# Patient Record
Sex: MALE | Race: WHITE | Employment: FULL TIME | URBAN - METROPOLITAN AREA
[De-identification: names, ages, dates, MRNs, and addresses within clinical notes are randomized per-mention and may not be internally consistent; named-entity substitution may affect disease eponyms.]

---

## 2020-06-09 ENCOUNTER — HOSPITAL ENCOUNTER (EMERGENCY)
Age: 30
Discharge: HOME | End: 2020-06-09
Payer: OTHER GOVERNMENT

## 2020-06-09 ENCOUNTER — APPOINTMENT (OUTPATIENT)
Dept: GENERAL RADIOLOGY | Age: 30
End: 2020-06-09
Payer: OTHER GOVERNMENT

## 2020-06-09 VITALS
SYSTOLIC BLOOD PRESSURE: 111 MMHG | TEMPERATURE: 98.1 F | HEIGHT: 66 IN | HEART RATE: 64 BPM | OXYGEN SATURATION: 98 % | DIASTOLIC BLOOD PRESSURE: 78 MMHG | RESPIRATION RATE: 16 BRPM | WEIGHT: 145 LBS | BODY MASS INDEX: 23.3 KG/M2

## 2020-06-09 PROCEDURE — 72100 X-RAY EXAM L-S SPINE 2/3 VWS: CPT

## 2020-06-09 PROCEDURE — 96372 THER/PROPH/DIAG INJ SC/IM: CPT

## 2020-06-09 PROCEDURE — 6360000002 HC RX W HCPCS: Performed by: PHYSICIAN ASSISTANT

## 2020-06-09 PROCEDURE — 99283 EMERGENCY DEPT VISIT LOW MDM: CPT

## 2020-06-09 PROCEDURE — 6370000000 HC RX 637 (ALT 250 FOR IP): Performed by: PHYSICIAN ASSISTANT

## 2020-06-09 RX ORDER — NAPROXEN 500 MG/1
500 TABLET ORAL 2 TIMES DAILY
Qty: 20 TABLET | Refills: 0 | Status: SHIPPED | OUTPATIENT
Start: 2020-06-09 | End: 2020-10-06

## 2020-06-09 RX ORDER — METHYLPREDNISOLONE 4 MG/1
TABLET ORAL
Qty: 1 KIT | Refills: 0 | Status: SHIPPED | OUTPATIENT
Start: 2020-06-09 | End: 2020-06-15

## 2020-06-09 RX ORDER — LIDOCAINE 4 G/G
1 PATCH TOPICAL ONCE
Status: DISCONTINUED | OUTPATIENT
Start: 2020-06-09 | End: 2020-06-09 | Stop reason: HOSPADM

## 2020-06-09 RX ORDER — METHYLPREDNISOLONE SODIUM SUCCINATE 125 MG/2ML
125 INJECTION, POWDER, LYOPHILIZED, FOR SOLUTION INTRAMUSCULAR; INTRAVENOUS ONCE
Status: COMPLETED | OUTPATIENT
Start: 2020-06-09 | End: 2020-06-09

## 2020-06-09 RX ORDER — KETOROLAC TROMETHAMINE 30 MG/ML
30 INJECTION, SOLUTION INTRAMUSCULAR; INTRAVENOUS ONCE
Status: COMPLETED | OUTPATIENT
Start: 2020-06-09 | End: 2020-06-09

## 2020-06-09 RX ORDER — LIDOCAINE 50 MG/G
1 PATCH TOPICAL DAILY
Qty: 30 PATCH | Refills: 0 | Status: SHIPPED | OUTPATIENT
Start: 2020-06-09 | End: 2020-07-09

## 2020-06-09 RX ADMIN — METHYLPREDNISOLONE SODIUM SUCCINATE 125 MG: 125 INJECTION, POWDER, FOR SOLUTION INTRAMUSCULAR; INTRAVENOUS at 10:47

## 2020-06-09 RX ADMIN — KETOROLAC TROMETHAMINE 30 MG: 30 INJECTION, SOLUTION INTRAMUSCULAR at 10:47

## 2020-06-09 ASSESSMENT — ENCOUNTER SYMPTOMS
EYE DISCHARGE: 0
SORE THROAT: 0
NAUSEA: 0
VOMITING: 0
EYE REDNESS: 0
COUGH: 0
BACK PAIN: 1
DIARRHEA: 0
SINUS PAIN: 0
ABDOMINAL PAIN: 0
CONSTIPATION: 0
RHINORRHEA: 0
SHORTNESS OF BREATH: 0
SINUS PRESSURE: 0
CHEST TIGHTNESS: 0

## 2020-06-09 ASSESSMENT — PAIN SCALES - GENERAL
PAINLEVEL_OUTOF10: 8
PAINLEVEL_OUTOF10: 8

## 2020-06-09 ASSESSMENT — PAIN DESCRIPTION - LOCATION: LOCATION: BACK

## 2020-06-09 NOTE — ED PROVIDER NOTES
[address] ED  EMERGENCY DEPARTMENT ENCOUNTER        Pt Name: Vivian Aguilar  MRN: [id number]  [place] 1990  Date of evaluation: 6/9/2020  Provider: Brooke Abarca PA-C  PCP: THOR Garg CNP  ED Attending: No att. providers found      This patient was not seen and evaluated by the attending physician No att. providers found. I have independently evaluated this patient. 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 Vivian Aguilar is a 30 y.o. male for evaluation of lower back pain after stepping over a lazyboy chair, felt a popping crunching sound. Sudden onset of pain. Pressure like pain. 8/10 pain is constant no matter what position he is in. H/o chronic back pain. No weakness no fever, denies any incontinece or painful urination. Nursing Notes were all reviewed and agreed with or any disagreements were addressed  in the HPI. REVIEW OF SYSTEMS  (2-9 systems for level 4, 10 or more for level 5)     Review of Systems   Constitutional: Negative for chills and fever. HENT: Negative. Negative for congestion, rhinorrhea, sinus pressure, sinus pain and sore throat. Eyes: Negative for discharge, redness and visual disturbance. Respiratory: Negative for cough, chest tightness and shortness of breath. Cardiovascular: Negative for chest pain and palpitations. Gastrointestinal: Negative for abdominal pain, constipation, diarrhea, nausea and vomiting. Genitourinary: Negative for difficulty urinating, dysuria and frequency. Musculoskeletal: Positive for back pain. Skin: Negative. Neurological: Negative. Negative for dizziness, weakness, numbness and headaches. EKG.      RADIOLOGY:         Interpretation per the Radiologist below, if available at the time of this note:    XR LUMBAR SPINE (2-3 VIEWS)   Final Result   L5-S1 grade 1 spondylolisthesis associated with bilateral pars defects. Although there are no comparisons, the degree of anterior slippages likely   chronic. No acute osseous abnormality is otherwise identified. EMERGENCY DEPARTMENT COURSE and DIFFERENTIAL DIAGNOSIS/MDM:   Vitals:    Vitals:    06/09/20 0809   BP: 114/73   Pulse: 71   Resp: 12   Temp: 98.1 °F (36.7 °C)   TempSrc: Oral   SpO2: 99%   Weight: 145 lb (65.8 kg)   Height: 5' 6\" (1.676 m)       Patient was given the following medications:  Medications   ketorolac (TORADOL) injection 30 mg (has no administration in time range)   methylPREDNISolone sodium (SOLU-MEDROL) injection 125 mg (has no administration in time range)   lidocaine 4 % external patch 1 patch (has no administration in time range)         Afebrile, stable, patient presents to the ED for evaluation. Nontoxic patient in no acute distress presents to the ED for evaluation of a crunching sensation and worsening chronic lower back pain. Patient's had past injuries to his back. Patient has not taken anything for his symptoms provided patient with Toradol and Solu-Medrol in addition to a Lidoderm patch this helps his symptoms on reevaluation x-rays are ordered to rule out acute bony injury. X-rays reveal an L5-S1 grade 1 spondylolithiasis associated with a bilateral pars defect of L5 vertebral body heights and disc spacing is preserved patient is able to ambulate has sensation to his lower extremities reflexes are intact. Discussed findings with the patient advised he will need to follow-up with spine. All questions are answered. Indications for return to the ED are discussed. Patient is advised if any new or worsening symptoms arise they should immediately return to the emergency room.   Follow-up with primary care

## 2020-10-06 ENCOUNTER — HOSPITAL ENCOUNTER (EMERGENCY)
Age: 30
Discharge: HOME | End: 2020-10-06
Attending: EMERGENCY MEDICINE
Payer: OTHER GOVERNMENT

## 2020-10-06 VITALS
DIASTOLIC BLOOD PRESSURE: 64 MMHG | SYSTOLIC BLOOD PRESSURE: 117 MMHG | OXYGEN SATURATION: 98 % | BODY MASS INDEX: 23.86 KG/M2 | WEIGHT: 152 LBS | HEIGHT: 67 IN | HEART RATE: 68 BPM | RESPIRATION RATE: 16 BRPM | TEMPERATURE: 97.8 F

## 2020-10-06 PROCEDURE — 6360000002 HC RX W HCPCS: Performed by: EMERGENCY MEDICINE

## 2020-10-06 PROCEDURE — 99282 EMERGENCY DEPT VISIT SF MDM: CPT

## 2020-10-06 PROCEDURE — 96372 THER/PROPH/DIAG INJ SC/IM: CPT

## 2020-10-06 RX ORDER — KETOROLAC TROMETHAMINE 30 MG/ML
30 INJECTION, SOLUTION INTRAMUSCULAR; INTRAVENOUS ONCE
Status: COMPLETED | OUTPATIENT
Start: 2020-10-06 | End: 2020-10-06

## 2020-10-06 RX ADMIN — KETOROLAC TROMETHAMINE 30 MG: 30 INJECTION, SOLUTION INTRAMUSCULAR; INTRAVENOUS at 08:26

## 2020-10-06 ASSESSMENT — PAIN DESCRIPTION - LOCATION: LOCATION: BACK

## 2020-10-06 ASSESSMENT — PAIN DESCRIPTION - DESCRIPTORS: DESCRIPTORS: CONSTANT;SHARP

## 2020-10-06 ASSESSMENT — PAIN DESCRIPTION - PAIN TYPE: TYPE: ACUTE PAIN

## 2020-10-06 ASSESSMENT — PAIN DESCRIPTION - ORIENTATION: ORIENTATION: LOWER

## 2020-10-06 ASSESSMENT — PAIN SCALES - GENERAL: PAINLEVEL_OUTOF10: 8

## 2020-10-06 NOTE — ED TRIAGE NOTES
Chief Complaint   Patient presents with    Back Pain     pt c/o lower back pain onset this am when getting out of bed, states felt a \"crunch\" and started having pain, hx of back problems

## 2020-10-06 NOTE — ED PROVIDER NOTES
[address] ED      CHIEF COMPLAINT  Back Pain (pt c/o lower back pain onset this am when getting out of bed, states felt a \"crunch\" and started having pain, hx of back problems)       HISTORY OF PRESENT ILLNESS  Sanju Sanderson is a 26 y.o. male  who presents to the ED for evaluation of back pain. Patient reports having h/o back problems and when he went to get up this morning, felt a \"crunch\" in his back. Reports having history of back injury about 6 7 years ago and has been evaluated by surgery. Since then, reports having intermittent episodes of these lower back pain. Describes pain as lower back bilateral pain with intermittently shooting pain down the right leg. Reports this is unchanged compared his prior back pains. Denies having taken any medications prior to arrival to the emergency department. Denies having associated numbness of extremities, urinary, or bowel incontinence. Denies any new injuries. Denies having any recent illnesses. Specifically denies having fevers, chills, or neck pain. No other complaints, modifying factors or associated symptoms. I have reviewed the following from the nursing documentation. Past Medical History:   Diagnosis Date    Chronic back pain      Past Surgical History:   Procedure Laterality Date    SHOULDER SURGERY       History reviewed. No pertinent family history.   Social History     Socioeconomic History    Marital status:      Spouse name: Not on file    Number of children: Not on file    Years of education: Not on file    Highest education level: Not on file   Occupational History    Not on file   Social Needs    Financial resource strain: Not on file    Food insecurity     Worry: Not on file     Inability: Not on file    Transportation needs     Medical: Not on file     Non-medical: Not on file   Tobacco Use    Smoking status: Former Smoker    Smokeless tobacco: Never Used   Substance and Sexual Activity    Alcohol use: Yes     Alcohol/week: 2.0 - 3.0 standard drinks     Types: 2 - 3 Cans of beer per week     Comment: weekends    Drug use: No    Sexual activity: Not on file   Lifestyle    Physical activity     Days per week: Not on file     Minutes per session: Not on file    Stress: Not on file   Relationships    Social connections     Talks on phone: Not on file     Gets together: Not on file     Attends [employer]an service: Not on file     Active member of club or organization: Not on file     Attends meetings of clubs or organizations: Not on file     Relationship status: Not on file    Intimate partner violence     Fear of current or ex partner: Not on file     Emotionally abused: Not on file     Physically abused: Not on file     Forced sexual activity: Not on file   Other Topics Concern    Not on file   Social History Narrative    Not on file     No current facility-administered medications for this encounter. No current outpatient medications on file. No Known Allergies    REVIEW OF SYSTEMS  10 systems reviewed, pertinent positives per HPI otherwise noted to be negative. PHYSICAL EXAM  /80   Pulse 65   Temp 97.8 °F (36.6 °C) (Oral)   Resp 16   Ht 5' 6.5\" (1.689 m)   Wt 152 lb (68.9 kg)   SpO2 98%   BMI 24.17 kg/m²    GENERAL APPEARANCE: Awake and alert. Cooperative. No acute distress. HENT: Normocephalic. Atraumatic. CN  2-12 grossly intact. HEART/CHEST: RRR. No murmurs appreciated  LUNGS: Respirations unlabored. Speaking comfortably in full sentences. CTAB. ABDOMEN: Soft, non-distended abdomen. Non tender to palpation. No guarding. No rebound. BACK: No nuchal rigidity. No C/T/L-spine step-offs. Tenderness to lower mid lumbar spine and bilateral paraspinal muscles. EXTREMITIES: No gross deformities. Moving all extremities. All extremities neurovascularly intact. SKIN: Warm and dry. No acute rashes. NEUROLOGICAL: Alert and oriented. CN's 2-12 intact. No gross facial drooping.  Strength 5/5, sensation intact. PSYCHIATRIC: Normal mood and affect. LABS  No results found for this visit on 10/06/20. RADIOLOGY  N/A    ED COURSE/MDM  Patient seen and evaluated. At presentation, patient was awake, alert, answering questions appropriately, afebrile, hemodynamically stable, and satting well on room air. Exam remarkable for midline lower lumbar tenderness to palpation along with bilateral paraspinal muscles. Differential diagnosis includes acute exacerbation of chronic back pain, cauda equina, vertebral fracture, among others. However, in the absence of injuries, though likelihood of a new pathology that would be seen on x-ray or CT lumbar spine. Per chart review, patient had x-rays at his prior visits showing spondylolisthesis and pars defects. As patient has no focal neurologic deficits although bilateral lower extremity flexion limited by pain, patient does not warrant imaging at this time. Patient given intramuscular Toradol dose in the emergency department. Patient was instructed to follow-up with his surgeons who he had prior evaluations from, or with PCP within 2 days for further evaluation. He was agreeable with plan. He was provided with a work excuse for 2 days and given work limitations to be cleared by his PCP. Patient discharged home with strict return precautions. During the patient's ED course, the patient was given:  Medications   ketorolac (TORADOL) injection 30 mg (30 mg Intramuscular Given 10/6/20 0826)        CLINICAL IMPRESSION  1. Acute exacerbation of chronic low back pain    2. Strain of lumbar region, initial encounter        Blood pressure 127/80, pulse 65, temperature 97.8 °F (36.6 °C), temperature source Oral, resp. rate 16, height 5' 6.5\" (1.689 m), weight 152 lb (68.9 kg), SpO2 98 %. [place] was discharged home in stable condition. Patient was given scripts for the following medications.  I counseled patient how to take these

## 2020-12-16 ENCOUNTER — HOSPITAL ENCOUNTER (INPATIENT)
Age: 30
LOS: 1 days | Discharge: HOME | DRG: 881 | End: 2020-12-16
Attending: FAMILY MEDICINE | Admitting: PSYCHIATRY & NEUROLOGY
Payer: OTHER GOVERNMENT

## 2020-12-16 VITALS
RESPIRATION RATE: 16 BRPM | OXYGEN SATURATION: 99 % | HEIGHT: 68 IN | HEART RATE: 101 BPM | TEMPERATURE: 99.1 F | WEIGHT: 150 LBS | DIASTOLIC BLOOD PRESSURE: 79 MMHG | BODY MASS INDEX: 22.73 KG/M2 | SYSTOLIC BLOOD PRESSURE: 133 MMHG

## 2020-12-16 PROBLEM — F32.9 MDD (MAJOR DEPRESSIVE DISORDER), SINGLE EPISODE: Status: ACTIVE | Noted: 2020-12-16

## 2020-12-16 PROBLEM — G89.29 CHRONIC LOW BACK PAIN: Status: ACTIVE | Noted: 2020-12-16

## 2020-12-16 PROBLEM — Z63.4 BEREAVEMENT: Status: ACTIVE | Noted: 2020-12-16

## 2020-12-16 PROBLEM — M54.50 CHRONIC LOW BACK PAIN: Status: ACTIVE | Noted: 2020-12-16

## 2020-12-16 PROBLEM — F43.10 PTSD (POST-TRAUMATIC STRESS DISORDER): Status: ACTIVE | Noted: 2020-12-16

## 2020-12-16 LAB
A/G RATIO: 1.5 (ref 1.1–2.2)
ACETAMINOPHEN LEVEL: <5 UG/ML (ref 10–30)
ALBUMIN SERPL-MCNC: 4.8 G/DL (ref 3.4–5)
ALP BLD-CCNC: 74 U/L (ref 40–129)
ALT SERPL-CCNC: 37 U/L (ref 10–40)
ANION GAP SERPL CALCULATED.3IONS-SCNC: 14 MMOL/L (ref 3–16)
AST SERPL-CCNC: 30 U/L (ref 15–37)
BASOPHILS ABSOLUTE: 0.3 K/UL (ref 0–0.2)
BASOPHILS RELATIVE PERCENT: 3.4 %
BILIRUB SERPL-MCNC: 0.4 MG/DL (ref 0–1)
BUN BLDV-MCNC: 9 MG/DL (ref 7–20)
CALCIUM SERPL-MCNC: 9.2 MG/DL (ref 8.3–10.6)
CHLORIDE BLD-SCNC: 104 MMOL/L (ref 99–110)
CO2: 26 MMOL/L (ref 21–32)
CREAT SERPL-MCNC: 0.9 MG/DL (ref 0.9–1.3)
EOSINOPHILS ABSOLUTE: 0.1 K/UL (ref 0–0.6)
EOSINOPHILS RELATIVE PERCENT: 1.5 %
ETHANOL: 127 MG/DL (ref 0–0.08)
ETHANOL: 71 MG/DL (ref 0–0.08)
GFR AFRICAN AMERICAN: >60
GFR NON-AFRICAN AMERICAN: >60
GLOBULIN: 3.3 G/DL
GLUCOSE BLD-MCNC: 99 MG/DL (ref 70–99)
HCT VFR BLD CALC: 45 % (ref 40.5–52.5)
HEMOGLOBIN: 15 G/DL (ref 13.5–17.5)
LYMPHOCYTES ABSOLUTE: 1.7 K/UL (ref 1–5.1)
LYMPHOCYTES RELATIVE PERCENT: 20.9 %
MCH RBC QN AUTO: 27.4 PG (ref 26–34)
MCHC RBC AUTO-ENTMCNC: 33.3 G/DL (ref 31–36)
MCV RBC AUTO: 82.2 FL (ref 80–100)
MONOCYTES ABSOLUTE: 0.4 K/UL (ref 0–1.3)
MONOCYTES RELATIVE PERCENT: 5.1 %
NEUTROPHILS ABSOLUTE: 5.5 K/UL (ref 1.7–7.7)
NEUTROPHILS RELATIVE PERCENT: 69.1 %
PDW BLD-RTO: 13.6 % (ref 12.4–15.4)
PLATELET # BLD: 248 K/UL (ref 135–450)
PMV BLD AUTO: 7.8 FL (ref 5–10.5)
POTASSIUM SERPL-SCNC: 3.9 MMOL/L (ref 3.5–5.1)
RBC # BLD: 5.47 M/UL (ref 4.2–5.9)
SALICYLATE, SERUM: <0.3 MG/DL (ref 15–30)
SARS-COV-2, NAAT: NOT DETECTED
SODIUM BLD-SCNC: 144 MMOL/L (ref 136–145)
T4 FREE: 1.7 NG/DL (ref 0.9–1.8)
TOTAL PROTEIN: 8.1 G/DL (ref 6.4–8.2)
TSH SERPL DL<=0.05 MIU/L-ACNC: 5.5 UIU/ML (ref 0.27–4.2)
WBC # BLD: 7.9 K/UL (ref 4–11)

## 2020-12-16 PROCEDURE — 36415 COLL VENOUS BLD VENIPUNCTURE: CPT

## 2020-12-16 PROCEDURE — G0480 DRUG TEST DEF 1-7 CLASSES: HCPCS

## 2020-12-16 PROCEDURE — 80053 COMPREHEN METABOLIC PANEL: CPT

## 2020-12-16 PROCEDURE — 5130000000 HC BRIDGE APPOINTMENT

## 2020-12-16 PROCEDURE — 84443 ASSAY THYROID STIM HORMONE: CPT

## 2020-12-16 PROCEDURE — 85025 COMPLETE CBC W/AUTO DIFF WBC: CPT

## 2020-12-16 PROCEDURE — U0002 COVID-19 LAB TEST NON-CDC: HCPCS

## 2020-12-16 PROCEDURE — 99221 1ST HOSP IP/OBS SF/LOW 40: CPT | Performed by: NURSE PRACTITIONER

## 2020-12-16 PROCEDURE — 1240000000 HC EMOTIONAL WELLNESS R&B

## 2020-12-16 PROCEDURE — 99223 1ST HOSP IP/OBS HIGH 75: CPT | Performed by: PSYCHIATRY & NEUROLOGY

## 2020-12-16 PROCEDURE — 99284 EMERGENCY DEPT VISIT MOD MDM: CPT

## 2020-12-16 PROCEDURE — 6370000000 HC RX 637 (ALT 250 FOR IP): Performed by: PSYCHIATRY & NEUROLOGY

## 2020-12-16 PROCEDURE — 84439 ASSAY OF FREE THYROXINE: CPT

## 2020-12-16 RX ORDER — TRAZODONE HYDROCHLORIDE 50 MG/1
50 TABLET ORAL NIGHTLY PRN
Status: DISCONTINUED | OUTPATIENT
Start: 2020-12-16 | End: 2020-12-16 | Stop reason: HOSPADM

## 2020-12-16 RX ORDER — MAGNESIUM HYDROXIDE/ALUMINUM HYDROXICE/SIMETHICONE 120; 1200; 1200 MG/30ML; MG/30ML; MG/30ML
30 SUSPENSION ORAL EVERY 6 HOURS PRN
Status: DISCONTINUED | OUTPATIENT
Start: 2020-12-16 | End: 2020-12-16 | Stop reason: HOSPADM

## 2020-12-16 RX ORDER — DULOXETIN HYDROCHLORIDE 30 MG/1
30 CAPSULE, DELAYED RELEASE ORAL DAILY
Status: DISCONTINUED | OUTPATIENT
Start: 2020-12-16 | End: 2020-12-16 | Stop reason: HOSPADM

## 2020-12-16 RX ORDER — ACETAMINOPHEN 325 MG/1
650 TABLET ORAL EVERY 4 HOURS PRN
Status: DISCONTINUED | OUTPATIENT
Start: 2020-12-16 | End: 2020-12-16 | Stop reason: HOSPADM

## 2020-12-16 RX ORDER — DULOXETIN HYDROCHLORIDE 30 MG/1
30 CAPSULE, DELAYED RELEASE ORAL DAILY
Qty: 30 CAPSULE | Refills: 3 | Status: SHIPPED | OUTPATIENT
Start: 2020-12-16 | End: 2021-12-12

## 2020-12-16 RX ORDER — OLANZAPINE 10 MG/1
10 INJECTION, POWDER, LYOPHILIZED, FOR SOLUTION INTRAMUSCULAR
Status: DISCONTINUED | OUTPATIENT
Start: 2020-12-16 | End: 2020-12-16 | Stop reason: HOSPADM

## 2020-12-16 RX ORDER — IBUPROFEN 400 MG/1
400 TABLET ORAL EVERY 6 HOURS PRN
Status: DISCONTINUED | OUTPATIENT
Start: 2020-12-16 | End: 2020-12-16 | Stop reason: HOSPADM

## 2020-12-16 RX ORDER — BENZTROPINE MESYLATE 1 MG/ML
2 INJECTION INTRAMUSCULAR; INTRAVENOUS 2 TIMES DAILY PRN
Status: DISCONTINUED | OUTPATIENT
Start: 2020-12-16 | End: 2020-12-16 | Stop reason: HOSPADM

## 2020-12-16 RX ORDER — OLANZAPINE 10 MG/1
10 TABLET ORAL
Status: DISCONTINUED | OUTPATIENT
Start: 2020-12-16 | End: 2020-12-16 | Stop reason: HOSPADM

## 2020-12-16 RX ADMIN — DULOXETINE HYDROCHLORIDE 30 MG: 30 CAPSULE, DELAYED RELEASE ORAL at 10:58

## 2020-12-16 ASSESSMENT — LIFESTYLE VARIABLES
HISTORY_ALCOHOL_USE: NO
HISTORY_ALCOHOL_USE: NO

## 2020-12-16 ASSESSMENT — SLEEP AND FATIGUE QUESTIONNAIRES
DO YOU USE A SLEEP AID: YES
DIFFICULTY STAYING ASLEEP: NO
DIFFICULTY ARISING: YES
RESTFUL SLEEP: YES
AVERAGE NUMBER OF SLEEP HOURS: 6
DIFFICULTY STAYING ASLEEP: YES
AVERAGE NUMBER OF SLEEP HOURS: 6
DO YOU USE A SLEEP AID: YES
DO YOU HAVE DIFFICULTY SLEEPING: YES
SLEEP PATTERN: DIFFICULTY FALLING ASLEEP
RESTFUL SLEEP: NO
SLEEP PATTERN: DIFFICULTY FALLING ASLEEP
DIFFICULTY FALLING ASLEEP: YES
DIFFICULTY ARISING: NO
DO YOU HAVE DIFFICULTY SLEEPING: YES
DIFFICULTY FALLING ASLEEP: YES

## 2020-12-16 ASSESSMENT — PATIENT HEALTH QUESTIONNAIRE - PHQ9
SUM OF ALL RESPONSES TO PHQ QUESTIONS 1-9: 23
SUM OF ALL RESPONSES TO PHQ QUESTIONS 1-9: 2
SUM OF ALL RESPONSES TO PHQ QUESTIONS 1-9: 17

## 2020-12-16 ASSESSMENT — PAIN DESCRIPTION - PAIN TYPE: TYPE: CHRONIC PAIN

## 2020-12-16 ASSESSMENT — PAIN SCALES - GENERAL: PAINLEVEL_OUTOF10: 0

## 2020-12-16 ASSESSMENT — PAIN DESCRIPTION - LOCATION: LOCATION: BACK

## 2020-12-16 NOTE — PROGRESS NOTES
Patient arrived to the [facility] from [facility], he was escorted to the [facility] with [facility] staff and campus security. He is alert and oriented x 4, he currently denies any SI/HI,A/V hallucinations.

## 2020-12-16 NOTE — SUICIDE SAFETY PLAN
SAFETY PLAN    A suicide Safety Plan is a document that supports someone when they are having thoughts of suicide. Warning Signs that indicate a suicidal crisis may be developing: What (situations, thoughts, feelings, body sensations, behaviors, etc.) do you experience that lets you know you are beginning to think about suicide? 1. Feeling down    Internal Coping Strategies:  What things can I do (relaxation techniques, hobbies, physical activities, etc.) to take my mind off my problems without contacting another person? 1. Listen to music   2. workout  3. read    People and social settings that provide distraction: Who can I call or where can I go to distract me? 1. Name: Raymond Mane  Phone: [phone]  2. Name: Alexis Benedict   3. Name: Chelo Akhtar whom I can ask for help: Who can I call when I need help - for example, friends, family, clergy, someone else? 1. Name: Raymond Mane  Phone: [phone]  2. Name: Alexis Benedict   3. Name: Bari Zamora      Professionals or Behavioral Health agencies I can contact during a crisis: Who can I call for help - for example, my doctor, my psychiatrist, my psychologist, a mental health provider, a suicide hotline? 1. Clinician Name: [address] [place] [address]   Address: [facility] [address] [place], [facility]      Phone  #: [phone]    2. Suicide Prevention Lifeline: 2-401-050-TALK (8801)    Making the environment safe: How can I make my environment (house/apartment/living space) safer? For example, can I remove guns, medications, and other items? 1.  Wont self harm    Something that is important to me and worth living for is: my son and family

## 2020-12-16 NOTE — FLOWSHEET NOTE
Perception of most stressful event prior to hospitalization \"Being in pain for a decade. \"   Perception of changes needed \"Continue epidurals on my back and see the neurosurgon for my back pain. \"   Strengths and Limitations   Strengths Independent in basic self-care activities;Demonstrates basic social skills; Positive leisure interests; Positive support network   Limitations Difficulty problem solving/relies on others to help solve problems; Tendency to isolate self     CTRS completed pt's AT/OT Leisure Assessment.     Bhavya Moralez, CTRS

## 2020-12-16 NOTE — ED PROVIDER NOTES
Emergency Department Encounter    Patient: Tami Barrientos  MRN: [id number]  : 1990  Date of Evaluation: 2020  ED Provider:  Jose Juarez    Triage Chief Complaint:   Psychiatric Evaluation    [place]:  Tami Barrientos is a 26 y.o. male that presents increasing thoughts of hurting himself. Patient with chronic back pain since serving in [employer] 8 or 9 years ago. He is on no pain medications. No history of previous psychiatric diagnoses, medication, or admission. Patient states he has a plan but is reluctant to discuss it. Denies illicit alcohol or drug use. Patient with recent death of ulloa body. ROS - see HPI, below listed is current ROS at time of my eval:  General:  No fevers  Eyes:  No recent vison changes  ENT:  No sore throat, no nasal congestion  Cardiovascular:  No chest pain, no palpitations  Respiratory:  No shortness of breath  Gastrointestinal:  No pain, no nausea, no vomiting, no diarrhea  Musculoskeletal:  No muscle pain  Skin:  No rash  Neurologic:  no headache  Psychiatric:  No anxiety, + depression  Genitourinary:  No dysuria  Endocrine:  No unexpected weight gain, no unexpected weight loss  Extremities:  no edema, no pain    Past Medical History:   Diagnosis Date    Chronic back pain      Past Surgical History:   Procedure Laterality Date    SHOULDER SURGERY       History reviewed. No pertinent family history.   Social History     Socioeconomic History    Marital status:      Spouse name: Not on file    Number of children: Not on file    Years of education: Not on file    Highest education level: Not on file   Occupational History    Not on file   Social Needs    Financial resource strain: Not on file    Food insecurity     Worry: Not on file     Inability: Not on file    Transportation needs     Medical: Not on file     Non-medical: Not on file   Tobacco Use    Smoking status: Former Smoker    Smokeless tobacco: Never Used Substance and Sexual Activity    Alcohol use: Yes     Alcohol/week: 2.0 - 3.0 standard drinks     Types: 2 - 3 Cans of beer per week     Comment: weekends    Drug use: No    Sexual activity: Yes     Partners: Female   Lifestyle    Physical activity     Days per week: Not on file     Minutes per session: Not on file    Stress: Not on file   Relationships    Social connections     Talks on phone: Not on file     Gets together: Not on file     Attends Anabaptist service: Not on file     Active member of club or organization: Not on file     Attends meetings of clubs or organizations: Not on file     Relationship status: Not on file    Intimate partner violence     Fear of current or ex partner: Not on file     Emotionally abused: Not on file     Physically abused: Not on file     Forced sexual activity: Not on file   Other Topics Concern    Not on file   Social History Narrative    Not on file     No current facility-administered medications for this encounter. No current outpatient medications on file. No Known Allergies    Nursing Notes Reviewed    Physical Exam:  Triage VS:    ED Triage Vitals [12/16/20 0035]   Enc Vitals Group      BP (!) 139/92      Pulse 76      Resp 15      Temp 98.3 °F (36.8 °C)      Temp Source Oral      SpO2 98 %      Weight       Height       Head Circumference       Peak Flow       Pain Score       Pain Loc       Pain Edu? Excl. in [address]? General appearance:  No acute distress. Skin:  Warm. Dry. Eye:  Extraocular movements intact. Ears, nose, mouth and throat:  Oral mucosa moist   Neck:  Trachea midline. Extremity: Normal ROM     Heart:  Regular  Perfusion:  intact  Respiratory:   Respirations nonlabored. Abdominal:  Non distended. Neurological:  Alert and oriented times 3. No focal neuro deficits.              Psychiatric:  Flat, + depression, + suicidal ideations, no homicidal ideations I have reviewed and interpreted all of the currently available lab results from this visit (if applicable):  Results for orders placed or performed during the hospital encounter of 12/16/20   Acetaminophen Level   Result Value Ref Range    Acetaminophen Level <5 (L) 10 - 30 ug/mL   CBC Auto Differential   Result Value Ref Range    WBC 7.9 4.0 - 11.0 K/uL    RBC 5.47 4.20 - 5.90 M/uL    Hemoglobin 15.0 13.5 - 17.5 g/dL    Hematocrit 45.0 40.5 - 52.5 %    MCV 82.2 80.0 - 100.0 fL    MCH 27.4 26.0 - 34.0 pg    MCHC 33.3 31.0 - 36.0 g/dL    RDW 13.6 12.4 - 15.4 %    Platelets 652 738 - 433 K/uL    MPV 7.8 5.0 - 10.5 fL    Neutrophils % 69.1 %    Lymphocytes % 20.9 %    Monocytes % 5.1 %    Eosinophils % 1.5 %    Basophils % 3.4 %    Neutrophils Absolute 5.5 1.7 - 7.7 K/uL    Lymphocytes Absolute 1.7 1.0 - 5.1 K/uL    Monocytes Absolute 0.4 0.0 - 1.3 K/uL    Eosinophils Absolute 0.1 0.0 - 0.6 K/uL    Basophils Absolute 0.3 (H) 0.0 - 0.2 K/uL   Comprehensive Metabolic Panel   Result Value Ref Range    Sodium 144 136 - 145 mmol/L    Potassium 3.9 3.5 - 5.1 mmol/L    Chloride 104 99 - 110 mmol/L    CO2 26 21 - 32 mmol/L    Anion Gap 14 3 - 16    Glucose 99 70 - 99 mg/dL    BUN 9 7 - 20 mg/dL    CREATININE 0.9 0.9 - 1.3 mg/dL    GFR Non-African American >60 >60    GFR African American >60 >60    Calcium 9.2 8.3 - 10.6 mg/dL    Total Protein 8.1 6.4 - 8.2 g/dL    Alb 4.8 3.4 - 5.0 g/dL    Albumin/Globulin Ratio 1.5 1.1 - 2.2    Total Bilirubin 0.4 0.0 - 1.0 mg/dL    Alkaline Phosphatase 74 40 - 129 U/L    ALT 37 10 - 40 U/L    AST 30 15 - 37 U/L    Globulin 3.3 g/dL   Ethanol   Result Value Ref Range    Ethanol Lvl 747 mg/dL   Salicylate   Result Value Ref Range    Salicylate, Serum <1.5 (L) 15.0 - 30.0 mg/dL   Ethanol   Result Value Ref Range    Ethanol Lvl 71 mg/dL   COVID-19   Result Value Ref Range    SARS-CoV-2, NAAT Not Detected Not Detected      Radiographs (if obtained):  Radiologist's Report Reviewed:

## 2020-12-16 NOTE — BH NOTE
[facility]  Discharge Note    Pt discharged with followings belongings:   Dentures: None  Vision - Corrective Lenses: None  Hearing Aid: None  Jewelry: None  Body Piercings Removed: N/A  Clothing: Pants, Shirt, Footwear, Jacket / coat  Were All Patient Medications Collected?: Not Applicable   Valuables sent home with patient. Valuables retrieved from safe and returned to patient. Patient education on aftercare instructions: yes. Information faxed to [facility] by rylee [place] . Patient verbalize understanding of AVS:  yes. Status EXAM upon discharge:  Status and Exam  Normal: No  Facial Expression: Sad, Flat  Affect: Congruent  Level of Consciousness: Alert  Mood:Normal: No  Mood: Depressed, Sad  Motor Activity:Normal: Yes  Interview Behavior: Cooperative  Preception: [place] to Person, [place] to Time, [place] to Place, [place] to Situation  Attention:Normal: Yes  Thought Processes: Circumstantial, Tangential  Thought Content:Normal: Yes  Hallucinations: None  Delusions: No  Memory:Normal: Yes  Insight and Judgment: No  Insight and Judgment: Poor Insight  Present Suicidal Ideation: No  Present Homicidal Ideation: No      Metabolic Screening:    No results found for: LABA1C    No results found for: CHOL  No results found for: TRIG  No results found for: HDL  No components found for: LDLCAL  No results found for: Fariha Smith RN    Bridge Appointment completed: Reviewed Discharge Instructions with patient. Patient verbalizes understanding and agreement with the discharge plan using the teachback method.      Referral for Outpatient Tobacco Cessation Counseling, upon discharge (tevin X if applicable and completed):    ( )  Hospital staff assisted patient to call Quit Line or faxed referral                                   during hospitalization (X)  Recognizing danger situations (included triggers and roadblocks), if not completed on admission                    (X)  Coping skills (new ways to manage stress, exercise, relaxation techniques, changing routine, distraction), if not completed on admission                                   (X)  Basic information about quitting (benefits of quitting, techniques in how to quit, available resources, if not completed on admission  ( ) Referral for counseling faxed to [place]   ( ) Patient refused referral  ( ) Patient refused counseling  ( ) Patient refused smoking cessation medication upon discharge    Vaccinations (tevin X if applicable and completed):  ( ) Patient states already received influenza vaccine elsewhere  ( ) Patient received influenza vaccine during this hospitalization  (X) Patient refused influenza vaccine at this time

## 2020-12-16 NOTE — BH NOTE
`[facility]  Admission Note     Admission Type:   Admission Type:  Involuntary    Reason for admission:  Reason for Admission: patient sent wife a suicidal text message    PATIENT STRENGTHS:  Strengths: No significant Physical Illness, Positive Support, Employment, Communication, Motivated    Patient Strengths and Limitations:  Limitations: Tendency to isolate self, Difficulty problem solving/relies on others to help solve problems    Addictive Behavior:   Addictive Behavior  In the past 3 months, have you felt or has someone told you that you have a problem with:  : None  Do you have a history of Chemical Use?: No  Do you have a history of Alcohol Use?: No  Do you have a history of Street Drug Abuse?: No  Histroy of Prescripton Drug Abuse?: No    Medical Problems:   Past Medical History:   Diagnosis Date    Chronic back pain        Status EXAM:  Status and Exam  Normal: No  Facial Expression: Sad, Flat  Affect: Congruent  Level of Consciousness: Alert  Mood:Normal: No  Mood: Depressed, Sad  Motor Activity:Normal: Yes  Interview Behavior: Cooperative  Preception: [place] to Person, [place] to Time, [place] to Place, [place] to Situation  Attention:Normal: Yes  Thought Processes: Circumstantial, Tangential  Thought Content:Normal: Yes  Hallucinations: None  Delusions: No  Memory:Normal: Yes  Insight and Judgment: No  Insight and Judgment: Poor Insight  Present Suicidal Ideation: No  Present Homicidal Ideation: No    Tobacco Screening:  Practical Counseling, on admission, tevin X, if applicable and completed (first 3 are required if patient doesn't refuse):            (X)  Recognizing danger situations (included triggers and roadblocks)                    (X)  Coping skills (new ways to manage stress, exercise, relaxation techniques, changing routine, distraction) (X)  Basic information about quitting (benefits of quitting, techniques in how to quit, available resources  ( ) Referral for counseling faxed to [place]                              ( ) Patient refused counseling  ( ) Patient has not smoked in the last 30 days    Metabolic Screening:    No results found for: LABA1C    No results found for: CHOL  No results found for: TRIG  No results found for: HDL  No components found for: LDLCAL  No results found for: LABVLDL      Body mass index is 22.81 kg/m². BP Readings from Last 2 Encounters:   12/16/20 133/79   10/06/20 117/64           Pt admitted with followings belongings:  Dentures: None  Vision - Corrective Lenses: None  Hearing Aid: None  Jewelry: None  Body Piercings Removed: N/A  Clothing: Pants, Shirt, Footwear, Jacket / coat  Were All Patient Medications Collected?: Not Applicable     Valuables sent home with patient. Valuables placed in safe. Patient's home medications were verified. Patient oriented to surroundings and program expectations and copy of patient rights given. Received admission packet:  yes. Consents reviewed, signed yes. Refused voluntary. Patient verbalize understanding:  yes.     Patient education on precautions: yes                   Angelique Riggins RN

## 2020-12-16 NOTE — H&P
Hospital Medicine History & Physical      PCP: THOR Cifuentes CNP    Date of Admission: 12/16/2020    Date of Service: Pt seen/examined on 12/16/2020     Chief Complaint:    Chief Complaint   Patient presents with    Psychiatric Evaluation       History Of Present Illness: The patient is a 26 y.o. male with chronic back pain who presented to [facility] ED with complaint of SI. Patient was seen and evaluated in the ED by the ED medical provider, patient was medically cleared for admission to [facility] at [facility]. This note serves as an admission medical H&P.    PCP: THOR Cifuentes CNP  Tobacco use: former  ETOH use: on the weekends  Illicit drug use: denies    Patient denies any symptoms/complaints. Past Medical History:        Diagnosis Date    Chronic back pain        Past Surgical History:        Procedure Laterality Date    SHOULDER SURGERY         Medications Prior to Admission:    Prior to Admission medications    Medication Sig Start Date End Date Taking? Authorizing Provider   DULoxetine (CYMBALTA) 30 MG extended release capsule Take 1 capsule by mouth daily 12/16/20  Yes Graham Luque MD       Allergies:  Patient has no known allergies. Social History:     TOBACCO:   reports that he has quit smoking. He has never used smokeless tobacco.  ETOH:   reports current alcohol use of about 2.0 - 3.0 standard drinks of alcohol per week. Family History:   Positive as follows:    History reviewed. No pertinent family history.     REVIEW OF SYSTEMS:       Constitutional: Negative for fever   HENT: Negative for sore throat   Respiratory: Negative  for dyspnea, cough   Cardiovascular: Negative for chest pain   Gastrointestinal: Negative for vomiting, diarrhea   Genitourinary: Negative for dysuria  Musculoskeletal: Negative for arthralgias   Skin: Negative for rash   Neurological: Negative for syncope   Psychiatric/Behavorial: per psychiatric evaluation    PHYSICAL EXAM: /79   Pulse 101   Temp 99.1 °F (37.3 °C) (Temporal)   Resp 16   Ht 5' 8\" (1.727 m)   Wt 150 lb (68 kg)   SpO2 99%   BMI 22.81 kg/m²     Gen: No distress. Alert. Eyes: PERRL. No sclera icterus. No conjunctival injection. ENT: No discharge. Pharynx clear. Neck: No JVD. No Carotid Bruit. Trachea midline. Resp: No accessory muscle use. No crackles. No wheezes. No rhonchi. CV: Regular rate. Regular rhythm. No murmur. No rub. No edema. GI: Non-tender. Non-distended. Normal bowel sounds. Skin: Warm and dry. No nodule on exposed extremities. No rash on exposed extremities. M/S: No cyanosis. No joint deformity. No clubbing. Neuro: Awake. No focal neurologic deficit on exam.  Cranial nerves II through XII intact. Patient is able to ambulate without difficulty. Psych: Per psychiatry team evaluation       CBC:   Recent Labs     12/16/20  0045   WBC 7.9   HGB 15.0   HCT 45.0   MCV 82.2        BMP:   Recent Labs     12/16/20  0045      K 3.9      CO2 26   BUN 9   CREATININE 0.9     LIVER PROFILE:   Recent Labs     12/16/20  0045   AST 30   ALT 37   BILITOT 0.4   ALKPHOS 74     CULTURES  Rapid COVID: not detected    ASSESSMENT/PLAN:  Depression, SI  - management per psychiatry    Chronic back pain    Elevated TSH  - check free T4. This is pending. Pt has no medical complaints at this time. They were informed that should a medical concern arise during their admission they may have [facility] contact us.     Marcus Ortiz FNP-C  12/16/2020 11:21 AM

## 2020-12-16 NOTE — ED NOTES
Attempted to call spouse, Filemon Bustamante. Left generic vm and requested return call.      Tia Jackson RN  12/16/20 6822
Level of Care Disposition: admit     Patient was seen by ED provider and [facility] staff. The case presented to psychiatric provider on-call . Based on the ED evaluation and information presented to the provider by Janice Mathew it was determined that inpatient hospitalization is the least restrictive environment for the patient at this time. The patient will be admitted to the inpatient unit. Admitting provider did not order suicide precautions based on pt luca for safety on the unit. RATIONALE FOR ADMISSION:   Patient has a mental illness: depression, PTSD,     Patient at imminent risk of danger to self as demonstrated by sending text messages to wife saying goodbye and that she will never see him again and having suicidal thoughts.            Insurance Pre certification Authorization: Johnny Wilkinson MSW,LSW
Level of Care Disposition: admit     Patient was seen by ED provider and [facility] staff. The case presented to psychiatric provider on-call . Based on the ED evaluation and information presented to the provider by Chuy Cortes it was determined that inpatient hospitalization is the least restrictive environment for the patient at this time. The patient will be admitted to the inpatient unit. Admitting provider did not order suicide precautions based on pt luca for safety on the unit. RATIONALE FOR ADMISSION:   Patient has a mental illness: depression, PTSD    Patient at imminent risk of danger to self as demonstrated by sending text messages to wife saying goodbye and that she would never see him again, suicidal ideations.              Insurance Pre certification Authorization: Jennifer Gamez
Obtained Covid-19 swabbed specimen and repeat blood alcohol. Both specimens taken to the lab for processing. Patient denies any current distress. Monitored for safety.      Cathi Cary RN  12/16/20 9031
Obtained VS's. Patient denies any discomfort or distress at this time. Denies any current needs. Continues to be monitored for safety.      Sim Golden RN  12/16/20 5158
Patient states that he drank 1 beer yesterday around 1600. Writer informed patient that alcohol blood draw would need repeated in order for valid CSSRS screening. Verbalized understanding.      Yefri Sánchez RN  12/16/20 5684
Pt resting in room, nos signs of distress noted. Will continue to monitor for safety.      Annia Espitia MSW,LSW
Resting quietly with eyes closed, no outward s/s distress noted. Monitored for safety.      Edin Barahona RN  12/16/20 0309
Writer in to obtain screening information. Patient states that he, \"broke\" his back in the [employer] and had to retire with disability. 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 States that he has suffered from back pain for 10 years and that he is tired of always being in pain and not being able to be active or feel like doing any type of activity with anyone. Patient has a flat affect, poor eye contact. Voice is soft. Patient states that he has been going to  for services but states he has not been on any medications. States he goes to a pain clinic in [facility] but that he is resistant to taking any type of pain medication as he does not like to take pills. He was prescribed pain medication when the back injury occurred and states that he had stomach distress and pain taking it and that it, \"just knocked me out\". He has had 2 epidurals and states he is supposed to see a neurologist to discuss back surgery that may help with his pain, however, he has concerns about this. States that he had a comrade that he was able to talk about his PTSD, but he has passed away and he now has no one that he discusses his thoughts and feelings with in regards to this concern. Denies he is able to share with his spouse, \"everything I experienced over there. I would never want someone else to go through what I saw and what I experienced\". Patient has tears well up in his eyes, but stops self. Writer promoted permission for patient to express these emotions he has been carrying with his and not expressing. Patient in agreement that he believes that he needs help for his PTSD and depression. Aware of process for assessment.      Tia Jackson RN  12/16/20 9892
Current Substance use: denies     Trauma identified: denies abuse hx. Pt lost friend two months ago. Access to Firearms: Pt reports wife has a gun but it is locked up. ASSESSMENT FOR IMMINENT FUTURE DANGER:      RISK FACTORS:    [x]  Age <25 or >49   [x]  Male gender   [x]  Depressed mood   []  Active suicidal ideation   []  Suicide plan   []  Suicide attempt   []  Access to lethal means   []  Prior suicide attempt   []  Active substance abuse   []  Highly impulsive behaviors   []  Not attending to self-care/ADLs    [x]  Recent significant loss   [x]  Chronic pain or medical illness   []  Social isolation   []  History of violence   []  Active psychosis   []  Cognitive impairment    [x]  No outpatient services in place   []  Medication noncompliance   []  No collateral information to support safety      PROTECTIVE FACTORS:  [] Age >25 and <55  [] Female gender   [] Denies depression  [x] Denies suicidal ideation  [x] Does not have lethal plan   [x] Does not have access to guns or weapons  [] Patient is verbally luca for safety  [x] No prior suicide attempts  [] No active substance abuse  [x] Patient has social or family support  [x] No active psychosis or cognitive dysfunction  [x] Physically healthy  [] Has outpatient services in place  [] Compliant with recommended medications  [] Collateral information from supports patient safety   [x] Patient is future oriented with plans to working on developing a program from the ground up at [facility]        Clinical Summary:    Patient presents to [facility] on a SOB after sending text messages to his wife telling her bye and that she would never see him again. Pt left the house and was found at [email or web address] and brought into the hospital. Patient was clinically sober at the time of the evaluation. Patient was evaluated and offered supportive counseling.

## 2020-12-16 NOTE — FLOWSHEET NOTE
20 0905   Psychiatric History   Psychiatric history treatment Psychiatric admissions  [facility] 2019- very vague about why he was there)   Are there any medication issues? Yes   Support System   Support system Access to others   Types of Support System Spouse   Problems in support system Lack of friends/family; Losses;Isolated   Current Living Situation   Home Living Adequate   Living information Lives with others   Problems with living situation  No   Lack of basic needs No   SSDI/SSI none   Other government assistance none   Problems with environment none   Current abuse issues none   Supervised setting None   Relationship problems No   Medical and Self-Care Issues   Relevant medical problems HBP; kidney stones-recent; back pain   Relevant self-care issues none reported   Barriers to treatment No   Family Constellation   Spouse/partner-name/age Sil Laird   Children-names/ages none   Parents ; mother when he was 23; father when he was 23   Siblings none   Contact information none   Childhood   Raised by Biological mother;Biological father   Relevant family history Raised by both parents. Parents  when he was a teenager. No abuse.    History of abuse No   Legal History   Legal history No    Abuse Assessment   Physical Abuse Denies   Verbal Abuse Denies   Emotional abuse Denies   Financial Abuse Denies   Sexual abuse Denies   Elder abuse No   Substance Use   Use of substances  Yes   Substance 1   Substance used Marijuana   Amount/frequency/route daily   Age of first use 23   Last use/History day of admission   Motivation for SA Treatment   Stage of engagement Pre-engagement/engagement   Motivation for treatment No   Education   Education HS graduate -GED   Work History   Currently employed Yes  ()    service [employer];Diagnosis/Treatment of PTSD;Utilize VA benefits(Comment)   /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 Patient has been feeling depressed lately and sent a text to his girlfriend wherein he was saying goodbye to her. Vague about his responses. Denies drinking. Does endorse daily marijuana use. Patient is a combat  with a hx of PTSD. Oriented x4. No avh. No current s/h ideations reported.

## 2020-12-16 NOTE — FLOWSHEET NOTE
20 1101   Psychiatric History   Psychiatric history treatment Current treatment  (denied any past hospiatlization)   Contact information [facility]   Are there any medication issues? No   Support System   Support system Adequate   Types of Support System Spouse;Brother; Other (Comment)   Problems in support system Losses  ( friend  [de-identified])   Current Living Situation   Home Living Adequate   Living information Lives with others  (Lives with wife and 9year old son)   Problems with living situation  No   Lack of basic needs No   SSDI/SSI denied   Other government assistance [facility]   Problems with environment denied   Current abuse issues denied   Supervised setting None   Relationship problems No   Contact information NA   Medical and Self-Care Issues   Relevant medical problems chronic back pain due to broken back   Relevant self-care issues denied   Barriers to treatment No   Family Constellation   Spouse/partner-name/age wife - Tia   Children-names/ages son - Chago 9years old   Parents Mom - Dusty Davis, doesn't have contact with his bio dad   Siblings Brother - [address]   Contact information NA   Comment NA   Childhood   Raised by Biological mother; Other   Biological mother [address] father denied   Other Step-dad Rojean Felty helped raised   Relevant family history denied   History of abuse No   Comment denied   Legal History   Legal history No   Other relevant legal issues denied   Comment denied   Juvenile legal history No    Abuse Assessment   Physical Abuse Denies   Verbal Abuse Denies   Emotional abuse Denies   Financial Abuse Denies   Sexual abuse Denies   Elder abuse No   Possible abuse reported to:   (denied)   Substance Use   Use of substances  Yes   Substance 1   Substance used Alcohol   Amount/frequency/route 2-3 beers on the weekend Age of first use 19 or 22   Last use/History last night   Motivation for SA Treatment   Stage of engagement   (denied alcohol being a problem at this time)   Motivation for treatment No   Current barriers to treatment   (denied any need for treatment)   Comment NA   Education   Education Other (comment)  (reported he still need to finish college)   Special education   (denied)   Work History   Currently employed Yes  (works at [employer])   Recent job loss or change Other (Comment)  (6 years with [employer])   [facility] discharge  (Permenantly disabled at discharge)   /VA involvement [employer] 2010 - 2015, has been doing outpatient therapy   Leisure/Activity   Past interests work out, watch TV, hang out with son   Present interests work out, watch TV, hang out with son   Current daily activity reported he is working and taking care of his son, wife is currently working a lot for [employer] hours   Social with friends/family No   Cultural and Spiritual   Spiritual concerns No   Cultural concerns No   Comment denied   Collateral Contacts   Contacts   (denied)     Therapist met with pt to complete psychosocial and C-SSRS assessments. Pt was engaged in assessments with flat affect. Pt denied any SI during assessment; pt reported he had SI thoughts yesterday and that he was able to talk to his friend, sister, and mom for support before his wife called police to get him help. Pt denied ever having plan or intent to kill himself.      Chanda Recio MA, R-DMT, LPCC-S

## 2020-12-16 NOTE — BH NOTE
[facility]  Discharge Note    Pt discharged with followings belongings:   Dentures: None  Vision - Corrective Lenses: None  Hearing Aid: None  Jewelry: None  Body Piercings Removed: N/A  Clothing: Pants, Shirt, Footwear, Jacket / coat  Were All Patient Medications Collected?: Not Applicable   Valuables sent home with patient. Valuables retrieved from safe and returned to patient. Patient education on aftercare instructions: yes. Information faxed to [facility] by rylee Chand. Patient verbalize understanding of AVS:  yes.     Status EXAM upon discharge:  Status and Exam  Normal: No  Facial Expression: Sad, Flat  Affect: Congruent  Level of Consciousness: Alert  Mood:Normal: No  Mood: Depressed, Sad  Motor Activity:Normal: Yes  Interview Behavior: Cooperative  Preception: [place] to Person, [place] to Time, [place] to Place, [place] to Situation  Attention:Normal: Yes  Thought Processes: Circumstantial, Tangential  Thought Content:Normal: Yes  Hallucinations: None  Delusions: No  Memory:Normal: Yes  Insight and Judgment: No  Insight and Judgment: Poor Insight  Present Suicidal Ideation: No  Present Homicidal Ideation: No      Metabolic Screening:    No results found for: LABA1C    No results found for: CHOL  No results found for: TRIG  No results found for: HDL  No components found for: LDLCAL  No results found for: Sienna Whitt RN

## 2020-12-16 NOTE — PROGRESS NOTES
Spoke to Patient's wife with patient's permission. Kimberly Franco wife [phone] wanted to confirm that he was being discharged today and want the plan was. She was informed of him being prescribed medication. She was accepting of plan and will pick him up later today.

## 2020-12-16 NOTE — H&P
[address]                 [address] [facility] [place], [address]                              HISTORY AND PHYSICAL    PATIENT NAME: Tessy Aranda                       :        1990  MED REC NO:   [id number]                          ROOM:       2308  ACCOUNT NO:   [de-identified]                           ADMIT DATE: 2020  PROVIDER:     Jaylin Jaimes. Simba Pascal MD    CHIEF COMPLAINT:  Depression and suicidality. HISTORY OF PRESENT ILLNESS:  The patient is a 64-year-old male who  presented to the ED at [facility] on 2020 with suicidal  ideation. He stated that he has never been in any type of inpatient  treatment in the past, but stated that he has been feeling more  depressed and suicidal over the past few weeks. He states that he has  chronic back pain from an injury while serving in the [employer].  He  goes to Pain Management to [address] [place] [address], which started 6 weeks ago. He  states he was somewhat resistant to pain medication but has been using  tramadol p.r.n. He also had an epidural just recently, which he feels  is helpful. In addition to that, he had a friend  two months ago  after having an MI on a motorcycle. He knew him six years and they were  both in [employer] and supportive to each other. He states that this has  been a difficult thing for him to manage. He states that he has no  prior history of suicide attempts and today, he is not feeling suicidal.  He feels like his family cares and he was not sure of that until now. He states that he has a good job, child, and many reasons to live. He  described good sleep and appetite and good motivation and energy. He apparently injured his back in the [employer] and had to retire with  disability 10 years ago. He stated that at one point, he felt like a  failure because he was not able to return to duty following his injury. intact. Attention and concentration was good. Able to recall three  objects immediately. He said his mood was okay. Affect was somewhat  constricted. DIAGNOSES:  AXIS I:  1. Major depressive episode, recurrent, severe, without psychosis. 2.  PTSD. 3.  Bereavement. AXIS II:  Deferred. AXIS III:  Chronic back pain. AXIS IV:  Severe. AXIS V:  45. PLAN:  1. The patient is in agreement to a trial of Cymbalta 30 mg daily for  depression. 2.  The patient is requesting to be discharged home. 3.  We will discharge the patient with outpatient followup to the [place]  therapist as well as [address] [place] [address] for pain management. 4.  Recommended that the patient remain in the hospital, but he felt  that he was safe to be at home and felt like he needed to get back to  work and also be with his family, who he now feels is supportive. 5.  Wife was contacted by staff and is feeling safe to bring him home. Discharge will be on 12/16/2020. Spent approximately 70 minutes on this evaluation with more than 50% of  the time discussing the patient care and treatment options.         Jackson Rogers MD    D: 12/16/2020 12:41:25       T: 12/16/2020 14:33:39     [email or web address]  Job#: [id number]     Doc#: [id number]    CC:

## 2020-12-16 NOTE — BH NOTE
[address] [facility]  Treatment Team Note  Review Date & Time: 12/16/2020  0900    Patient was not in treatment team      Status EXAM:   Status and Exam  Normal: No  Facial Expression: Sad, Flat  Affect: Congruent  Level of Consciousness: Alert  Mood:Normal: No  Mood: Depressed, Sad  Motor Activity:Normal: Yes  Interview Behavior: Cooperative  Preception: Josephine to Person, Elby Sable to Time, Josephine to Place, Josephine to Situation  Attention:Normal: Yes  Thought Processes: Circumstantial, Tangential  Thought Content:Normal: Yes  Hallucinations: None  Delusions: No  Memory:Normal: Yes  Insight and Judgment: No  Insight and Judgment: Poor Insight  Present Suicidal Ideation: No  Present Homicidal Ideation: No      Suicide Risk CSSR-S:  1) Within the past month, have you wished you were dead or wished you could go to sleep and not wake up? : Yes  2) Have you actually had any thoughts of killing yourself? : Yes  3) Have you been thinking about how you might kill yourself? : Yes  5) Have you started to work out or worked out the details of how to kill yourself?  Do you intend to carry out this plan? : No  6) Have you ever done anything, started to do anything, or prepared to do anything to end your life?: No      PLAN/TREATMENT RECOMMENDATIONS UPDATE: Patient will take medication as prescribed, eat 75% of meals, attend groups, participate in milieu activities, participate in treatment team and care planning for discharge and follow up      Fredy Gonzalez RN

## 2020-12-16 NOTE — PLAN OF CARE
Patient has spent the majority of the shift in his room resting. He is A&Ox4 and denies and minimizes all s/s of mental health. He does admit that he made statements last  evening about having thoughts to want to harm self, but denies that he had any intent to follow through. He does talk about future orient things to live for and to be with his wife. He feels that this was a mistake and does not feel that he needs to stay in the hospital. He does present sad, but reports that he is fine. He is willing to start medication and get into outpatient care. He has been no issue when interacting with staff.

## 2020-12-16 NOTE — DISCHARGE SUMMARY
Discharge Summary   Admit Date: 12/16/2020   Discharge Date:    12/16/2020   Condition at DC stable  Final Dx: axis I: Major depressive disorder with current active episode                             PTSD  Axis 2: No diagnosis  [place] 3: See Medical History    And Present on Admission:   Major depressive disorder with current active episode   Bereavement   PTSD (post-traumatic stress disorder)   Chronic low back pain     Axis 4: Problems related to the social environment  Axis 5:  On Admission: 41-50 serious symptoms At Discharge: 61-70 mild symptoms   All conditions on Axis 1 and Axis 2 and active problems on Axis 3 were treated while patient was hospitalized. [facility] Problems    Diagnosis Date Noted    Major depressive disorder with current active episode [F32.9] 12/16/2020    Bereavement [Z63.4] 12/16/2020    PTSD (post-traumatic stress disorder) [F43.10] 12/16/2020    Chronic low back pain [M54.5, G89.29] 12/16/2020   )   Condition on DC  Mood and affect are stable and pt is not suicidal   VITALS:  /79   Pulse 101   Temp 99.1 °F (37.3 °C) (Temporal)   Resp 16   Ht 5' 8\" (1.727 m)   Wt 150 lb (68 kg)   SpO2 99%   BMI 22.81 kg/m²   Brief Summary Present Illness   CHIEF COMPLAINT:  Depression and suicidality.     HISTORY OF PRESENT ILLNESS:  The patient is a 26-year-old male who  presented to the ED at [facility] on 12/16/2020 with suicidal  ideation. He stated that he has never been in any type of inpatient  treatment in the past, but stated that he has been feeling more  depressed and suicidal over the past few weeks. He states that he has  chronic back pain from an injury while serving in the [employer].  He  goes to Pain Management to [address] [place] [address], which started 6 weeks ago. He  states he was somewhat resistant to pain medication but has been using  tramadol p.r.n.   He also had an epidural just recently, which he feels  WBC 12/16/2020 7.9  4.0 - 11.0 K/uL Final    RBC 12/16/2020 5.47  4.20 - 5.90 M/uL Final    Hemoglobin 12/16/2020 15.0  13.5 - 17.5 g/dL Final    Hematocrit 12/16/2020 45.0  40.5 - 52.5 % Final    MCV 12/16/2020 82.2  80.0 - 100.0 fL Final    MCH 12/16/2020 27.4  26.0 - 34.0 pg Final    MCHC 12/16/2020 33.3  31.0 - 36.0 g/dL Final    RDW 12/16/2020 13.6  12.4 - 15.4 % Final    Platelets 69/73/6816 248  135 - 450 K/uL Final    MPV 12/16/2020 7.8  5.0 - 10.5 fL Final    Neutrophils % 12/16/2020 69.1  % Final    Lymphocytes % 12/16/2020 20.9  % Final    Monocytes % 12/16/2020 5.1  % Final    Eosinophils % 12/16/2020 1.5  % Final    Basophils % 12/16/2020 3.4  % Final    Neutrophils Absolute 12/16/2020 5.5  1.7 - 7.7 K/uL Final    Lymphocytes Absolute 12/16/2020 1.7  1.0 - 5.1 K/uL Final    Monocytes Absolute 12/16/2020 0.4  0.0 - 1.3 K/uL Final    Eosinophils Absolute 12/16/2020 0.1  0.0 - 0.6 K/uL Final    Basophils Absolute 12/16/2020 0.3* 0.0 - 0.2 K/uL Final    Sodium 12/16/2020 144  136 - 145 mmol/L Final    Potassium 12/16/2020 3.9  3.5 - 5.1 mmol/L Final    Chloride 12/16/2020 104  99 - 110 mmol/L Final    CO2 12/16/2020 26  21 - 32 mmol/L Final    Anion Gap 12/16/2020 14  3 - 16 Final    Glucose 12/16/2020 99  70 - 99 mg/dL Final    BUN 12/16/2020 9  7 - 20 mg/dL Final    CREATININE 12/16/2020 0.9  0.9 - 1.3 mg/dL Final    GFR Non- 12/16/2020 >60  >60 Final    Comment: >60 mL/min/1.73m2 EGFR, calc. for ages 25 and older using the  MDRD formula (not corrected for weight), is valid for stable  renal function.  GFR  12/16/2020 >60  >60 Final    Comment: Chronic Kidney Disease: less than 60 ml/min/1.73 sq.m. Kidney Failure: less than 15 ml/min/1.73 sq.m. Results valid for patients 18 years and older.       Calcium 12/16/2020 9.2  8.3 - 10.6 mg/dL Final    Total Protein 12/16/2020 8.1  6.4 - 8.2 g/dL Final  Alb 12/16/2020 4.8  3.4 - 5.0 g/dL Final    Albumin/Globulin Ratio 12/16/2020 1.5  1.1 - 2.2 Final    Total Bilirubin 12/16/2020 0.4  0.0 - 1.0 mg/dL Final    Alkaline Phosphatase 12/16/2020 74  40 - 129 U/L Final    ALT 12/16/2020 37  10 - 40 U/L Final    AST 12/16/2020 30  15 - 37 U/L Final    Globulin 12/16/2020 3.3  g/dL Final    Ethanol Lvl 12/16/2020 127  mg/dL Final    Comment:    None Detected  Conversion factor:  100 mg/dl = .100 g/dl  For Medical Purposes Only      Salicylate, Serum 03/75/4836 <0.3* 15.0 - 30.0 mg/dL Final    Comment: Therapeutic Range: 15.0-30.0 mg/dL  Toxic: >30.0 mg/dL      Ethanol Lvl 12/16/2020 71  mg/dL Final    Comment:    None Detected  Conversion factor:  100 mg/dl = .100 g/dl  For Medical Purposes Only      SARS-CoV-2, NAAT 12/16/2020 Not Detected  Not Detected Final    Comment: Rapid NAAT:   Negative results should be treated as presumptive and,  if inconsistent with clinical signs and symptoms or necessary for  patient management, should be tested with an alternative molecular  assay. Negative results do not preclude SARS-CoV-2 infection and  should not be used as the sole basis for patient management decisions. This test has been authorized by the FDA under an Emergency Use  Authorization (EUA) for use by authorized laboratories.     Fact sheet for Healthcare Providers:  Jaxson.arleth  Fact sheet for Patients: Jaxson.arleth    METHODOLOGY: Isothermal Nucleic Acid Amplification      TSH 12/16/2020 5.50* 0.27 - 4.20 uIU/mL Final        Mental Status Exam at Discharge:  Level of consciousness:  awake  Appearance:  well-appearing, in chair, good grooming and good hygiene well-developed, well-nourished  Behavior/Motor:  no abnormalities noted normal gait and station AIMS: 0  Attitude toward examiner:  cooperative, attentive and good eye contact

## 2021-08-28 ENCOUNTER — APPOINTMENT (OUTPATIENT)
Dept: GENERAL RADIOLOGY | Age: 31
End: 2021-08-28
Payer: OTHER GOVERNMENT

## 2021-08-28 ENCOUNTER — HOSPITAL ENCOUNTER (EMERGENCY)
Age: 31
Discharge: HOME | End: 2021-08-29
Attending: EMERGENCY MEDICINE
Payer: OTHER GOVERNMENT

## 2021-08-28 DIAGNOSIS — R00.2 PALPITATIONS: ICD-10-CM

## 2021-08-28 DIAGNOSIS — R07.9 CHEST PAIN, UNSPECIFIED TYPE: Primary | ICD-10-CM

## 2021-08-28 LAB
A/G RATIO: 1.7 (ref 1.1–2.2)
ALBUMIN SERPL-MCNC: 4.4 G/DL (ref 3.4–5)
ALP BLD-CCNC: 63 U/L (ref 40–129)
ALT SERPL-CCNC: 17 U/L (ref 10–40)
ANION GAP SERPL CALCULATED.3IONS-SCNC: 12 MMOL/L (ref 3–16)
AST SERPL-CCNC: 19 U/L (ref 15–37)
BASOPHILS ABSOLUTE: 0.1 K/UL (ref 0–0.2)
BASOPHILS RELATIVE PERCENT: 0.8 %
BILIRUB SERPL-MCNC: <0.2 MG/DL (ref 0–1)
BUN BLDV-MCNC: 15 MG/DL (ref 7–20)
CALCIUM SERPL-MCNC: 8.7 MG/DL (ref 8.3–10.6)
CHLORIDE BLD-SCNC: 103 MMOL/L (ref 99–110)
CO2: 25 MMOL/L (ref 21–32)
CREAT SERPL-MCNC: 1.3 MG/DL (ref 0.9–1.3)
EOSINOPHILS ABSOLUTE: 0.1 K/UL (ref 0–0.6)
EOSINOPHILS RELATIVE PERCENT: 1.5 %
GFR AFRICAN AMERICAN: >60
GFR NON-AFRICAN AMERICAN: >60
GLOBULIN: 2.6 G/DL
GLUCOSE BLD-MCNC: 108 MG/DL (ref 70–99)
HCT VFR BLD CALC: 41.8 % (ref 40.5–52.5)
HEMOGLOBIN: 14 G/DL (ref 13.5–17.5)
LYMPHOCYTES ABSOLUTE: 3 K/UL (ref 1–5.1)
LYMPHOCYTES RELATIVE PERCENT: 33.1 %
MCH RBC QN AUTO: 27.4 PG (ref 26–34)
MCHC RBC AUTO-ENTMCNC: 33.6 G/DL (ref 31–36)
MCV RBC AUTO: 81.6 FL (ref 80–100)
MONOCYTES ABSOLUTE: 0.5 K/UL (ref 0–1.3)
MONOCYTES RELATIVE PERCENT: 5.2 %
NEUTROPHILS ABSOLUTE: 5.3 K/UL (ref 1.7–7.7)
NEUTROPHILS RELATIVE PERCENT: 59.4 %
PDW BLD-RTO: 13.2 % (ref 12.4–15.4)
PLATELET # BLD: 221 K/UL (ref 135–450)
PMV BLD AUTO: 8 FL (ref 5–10.5)
POTASSIUM REFLEX MAGNESIUM: 3.6 MMOL/L (ref 3.5–5.1)
RBC # BLD: 5.13 M/UL (ref 4.2–5.9)
SODIUM BLD-SCNC: 140 MMOL/L (ref 136–145)
TOTAL PROTEIN: 7 G/DL (ref 6.4–8.2)
TROPONIN: <0.01 NG/ML
WBC # BLD: 9 K/UL (ref 4–11)

## 2021-08-28 PROCEDURE — 84484 ASSAY OF TROPONIN QUANT: CPT

## 2021-08-28 PROCEDURE — 99284 EMERGENCY DEPT VISIT MOD MDM: CPT

## 2021-08-28 PROCEDURE — 85025 COMPLETE CBC W/AUTO DIFF WBC: CPT

## 2021-08-28 PROCEDURE — 80053 COMPREHEN METABOLIC PANEL: CPT

## 2021-08-28 PROCEDURE — 71045 X-RAY EXAM CHEST 1 VIEW: CPT

## 2021-08-28 PROCEDURE — 93005 ELECTROCARDIOGRAM TRACING: CPT | Performed by: EMERGENCY MEDICINE

## 2021-08-28 ASSESSMENT — ENCOUNTER SYMPTOMS
SHORTNESS OF BREATH: 1
VOMITING: 0
EYE DISCHARGE: 0
DIARRHEA: 0
SORE THROAT: 0
COUGH: 0
ABDOMINAL PAIN: 0
BACK PAIN: 0
NAUSEA: 0

## 2021-08-29 VITALS
RESPIRATION RATE: 12 BRPM | WEIGHT: 150 LBS | HEIGHT: 66 IN | OXYGEN SATURATION: 98 % | DIASTOLIC BLOOD PRESSURE: 68 MMHG | SYSTOLIC BLOOD PRESSURE: 109 MMHG | BODY MASS INDEX: 24.11 KG/M2 | HEART RATE: 61 BPM | TEMPERATURE: 97.2 F

## 2021-08-29 LAB
EKG ATRIAL RATE: 113 BPM
EKG ATRIAL RATE: 59 BPM
EKG DIAGNOSIS: NORMAL
EKG DIAGNOSIS: NORMAL
EKG P AXIS: 38 DEGREES
EKG P AXIS: 57 DEGREES
EKG P-R INTERVAL: 146 MS
EKG P-R INTERVAL: 168 MS
EKG Q-T INTERVAL: 326 MS
EKG Q-T INTERVAL: 422 MS
EKG QRS DURATION: 84 MS
EKG QRS DURATION: 88 MS
EKG QTC CALCULATION (BAZETT): 417 MS
EKG QTC CALCULATION (BAZETT): 447 MS
EKG R AXIS: 28 DEGREES
EKG R AXIS: 34 DEGREES
EKG T AXIS: 28 DEGREES
EKG T AXIS: 36 DEGREES
EKG VENTRICULAR RATE: 113 BPM
EKG VENTRICULAR RATE: 59 BPM
TROPONIN: <0.01 NG/ML

## 2021-08-29 PROCEDURE — 84484 ASSAY OF TROPONIN QUANT: CPT

## 2021-08-29 PROCEDURE — 93010 ELECTROCARDIOGRAM REPORT: CPT | Performed by: INTERNAL MEDICINE

## 2021-08-29 PROCEDURE — 93005 ELECTROCARDIOGRAM TRACING: CPT | Performed by: EMERGENCY MEDICINE

## 2021-08-29 NOTE — ED PROVIDER NOTES
[address] ED  EMERGENCY DEPARTMENT ENCOUNTER        Pt Name: Edd Hill  MRN: [id number]  [place] 1990  Date of evaluation: 8/28/2021  Provider: Isabella Jean MD  PCP: THOR Rain - CNP  ED Attending: Isabella Jean MD    [facility]       Chief Complaint   Patient presents with    Chest Pain     Onset approximately 2230       HISTORY OF PRESENT ILLNESS   (Location/Symptom, Timing/Onset, Context/Setting, Quality, Duration, Modifying Factors, Severity)  Note limiting factors. Edd Hill is a 33 y.o. male who presents to the emergency department with sudden onset of a tightening sensation in his chest that was moderate in intensity associated with palpitations radiating into the left jaw area. It occurred while he was at rest. He had about 15 minutes prior smoked some CBD oil via a vape pen. Patient states that 20 minutes later it went away as soon as it had started. He was concerned and so came into the emergency department. Right now he has a very mild tightening sensation in the chest however the palpitations are now gone and he no longer feels anxious. Patient denies any exacerbating or alleviating factors. No family history of heart disease. History is obtained from the patient. REVIEW OF SYSTEMS    (2-9 systems for level 4, 10 or more for level 5)     Review of Systems   Constitutional: Negative for chills and fever. HENT: Negative for congestion and sore throat. Eyes: Negative for discharge. Respiratory: Positive for shortness of breath. Negative for cough. Cardiovascular: Positive for chest pain and palpitations. Gastrointestinal: Negative for abdominal pain, diarrhea, nausea and vomiting. Endocrine: Negative for polydipsia. Genitourinary: Negative for dysuria and urgency. Musculoskeletal: Negative for back pain and myalgias. Skin: Negative for rash. Neurological: Negative for weakness and headaches.    Hematological: Does not bruise/bleed easily. Psychiatric/Behavioral: Negative for confusion. Positives and Pertinent negatives as per HPI. Except as noted above in the ROS, all other systems were reviewed and negative. PAST MEDICAL HISTORY     Past Medical History:   Diagnosis Date    Chronic back pain          SURGICAL HISTORY     Past Surgical History:   Procedure Laterality Date    SHOULDER SURGERY           CURRENTMEDICATIONS       Discharge Medication List as of 8/29/2021  2:24 AM      CONTINUE these medications which have NOT CHANGED    Details   DULoxetine (CYMBALTA) 30 MG extended release capsule Take 1 capsule by mouth daily, Disp-30 capsule, R-3Normal               ALLERGIES     Patient has no known allergies. FAMILYHISTORY     History reviewed. No pertinent family history. SOCIAL HISTORY       Social History     Socioeconomic History    Marital status:      Spouse name: None    Number of children: None    Years of education: None    Highest education level: None   Occupational History    None   Tobacco Use    Smoking status: Former Smoker    Smokeless tobacco: Never Used   Vaping Use    Vaping Use: Never used   Substance and Sexual Activity    Alcohol use: Yes     Alcohol/week: 2.0 - 3.0 standard drinks     Types: 2 - 3 Cans of beer per week     Comment: weekends    Drug use: No    Sexual activity: Yes     Partners: Female   Other Topics Concern    None   Social History Narrative    None     Social Determinants of Health     Financial Resource Strain:     Difficulty of Paying Living Expenses:    Food Insecurity:     Worried About Running Out of Food in the Last Year:     Ran Out of Food in the Last Year:    Transportation Needs:     Lack of Transportation (Medical):      Lack of Transportation (Non-Medical):    Physical Activity:     Days of Exercise per Week:     Minutes of Exercise per Session:    Stress:     Feeling of Stress :    Social Connections:     Frequency of Communication with Friends and Family:     Frequency of Social Gatherings with Friends and Family:     Attends Rastafarian Services:     Active Member of Clubs or Organizations:     Attends Club or Organization Meetings:     Marital Status:    Intimate Partner Violence:     Fear of Current or Ex-Partner:     Emotionally Abused:     Physically Abused:     Sexually Abused:        SCREENINGS    Yessenia Coma Scale  Eye Opening: Spontaneous  Best Verbal Response: Oriented  Best Motor Response: Obeys commands  Yessenia Coma Scale Score: 15        PHYSICAL EXAM    (up to 7 for level 4, 8 or more for level 5)     ED Triage Vitals [08/28/21 2255]   BP Temp Temp Source Pulse Resp SpO2 Height Weight   (!) 146/80 97.2 °F (36.2 °C) Oral 104 12 99 % 5' 6\" (1.676 m) 150 lb (68 kg)       Physical Exam  Constitutional:       General: He is not in acute distress. Appearance: He is well-developed. HENT:      Head: Normocephalic and atraumatic. Right Ear: External ear normal.      Left Ear: External ear normal.      Nose:      Comments: Nasal and oropharyngeal exam deferred secondary to Covid pandemic. Eyes:      Conjunctiva/sclera: Conjunctivae normal.      Pupils: Pupils are equal, round, and reactive to light. Cardiovascular:      Rate and Rhythm: Normal rate and regular rhythm. Heart sounds: Normal heart sounds. No murmur heard. No friction rub. No gallop. Pulmonary:      Effort: Pulmonary effort is normal. No respiratory distress. Breath sounds: Normal breath sounds. No wheezing. Abdominal:      General: Bowel sounds are normal. There is no distension. Palpations: Abdomen is soft. Tenderness: There is no abdominal tenderness. There is no guarding or rebound. Musculoskeletal:         General: No tenderness. Normal range of motion. Cervical back: Normal range of motion and neck supple. Lymphadenopathy:      Cervical: No cervical adenopathy.    Skin:     General: Skin is warm and dry.      Findings: No rash. Neurological:      Mental Status: He is alert and oriented to person, place, and time. Cranial Nerves: No cranial nerve deficit. Motor: No weakness.       Gait: Gait normal.   Psychiatric:         Mood and Affect: Mood normal.         Behavior: Behavior normal.         DIAGNOSTIC RESULTS   LABS:    Results for orders placed or performed during the hospital encounter of 08/28/21   CBC auto differential   Result Value Ref Range    WBC 9.0 4.0 - 11.0 K/uL    RBC 5.13 4.20 - 5.90 M/uL    Hemoglobin 14.0 13.5 - 17.5 g/dL    Hematocrit 41.8 40.5 - 52.5 %    MCV 81.6 80.0 - 100.0 fL    MCH 27.4 26.0 - 34.0 pg    MCHC 33.6 31.0 - 36.0 g/dL    RDW 13.2 12.4 - 15.4 %    Platelets 828 355 - 051 K/uL    MPV 8.0 5.0 - 10.5 fL    Neutrophils % 59.4 %    Lymphocytes % 33.1 %    Monocytes % 5.2 %    Eosinophils % 1.5 %    Basophils % 0.8 %    Neutrophils Absolute 5.3 1.7 - 7.7 K/uL    Lymphocytes Absolute 3.0 1.0 - 5.1 K/uL    Monocytes Absolute 0.5 0.0 - 1.3 K/uL    Eosinophils Absolute 0.1 0.0 - 0.6 K/uL    Basophils Absolute 0.1 0.0 - 0.2 K/uL   Comprehensive Metabolic Panel w/ Reflex to MG   Result Value Ref Range    Sodium 140 136 - 145 mmol/L    Potassium reflex Magnesium 3.6 3.5 - 5.1 mmol/L    Chloride 103 99 - 110 mmol/L    CO2 25 21 - 32 mmol/L    Anion Gap 12 3 - 16    Glucose 108 (H) 70 - 99 mg/dL    BUN 15 7 - 20 mg/dL    CREATININE 1.3 0.9 - 1.3 mg/dL    GFR Non-African American >60 >60    GFR African American >60 >60    Calcium 8.7 8.3 - 10.6 mg/dL    Total Protein 7.0 6.4 - 8.2 g/dL    Albumin 4.4 3.4 - 5.0 g/dL    Albumin/Globulin Ratio 1.7 1.1 - 2.2    Total Bilirubin <0.2 0.0 - 1.0 mg/dL    Alkaline Phosphatase 63 40 - 129 U/L    ALT 17 10 - 40 U/L    AST 19 15 - 37 U/L    Globulin 2.6 g/dL   Troponin   Result Value Ref Range    Troponin <0.01 <0.01 ng/mL   Troponin   Result Value Ref Range    Troponin <0.01 <0.01 ng/mL       All other labs were within normal range ornot returned as of this dictation. EKG: All EKG's are interpreted by the Emergency Department Physician who either signs or Co-signs this chart in the absence of a cardiologist.  Please see their note for interpretation of EKG. EKG Interpretation at 2250    Interpreted by emergency department physician    Rhythm: sinus tachycardia  Rate: tachycardia  Axis: normal  Ectopy: none  Conduction: normal  ST Segments: normal  T Waves: normal  Q Waves: none    Clinical Impression: sinus tachycardia    EKG Interpretation    Interpreted by emergency department physician    Rhythm: sinus bradycardia and sinus arrhythmia  Rate: bradycardia  Axis: normal  Ectopy: none  Conduction: normal  ST Segments: normal  T Waves: normal  Q Waves: none    Clinical Impression: Sinus bradycardia with sinus arrhythmia. Layne Hyatt MD      RADIOLOGY:   Non-plain film images such as CT, Ultrasound and MRI are read by the radiologist.  Plain radiographic images are visualized and preliminarily interpreted by the ED Provider with the belowfindings:    Interpretation per the Radiologist below, if available at the time of this note:    XR CHEST PORTABLE   Final Result   No acute process by radiograph. PROCEDURES   Unless otherwise noted below, none     Procedures    CRITICAL CARE TIME   N/A    CONSULTS:  None      EMERGENCY DEPARTMENT COURSE and DIFFERENTIAL DIAGNOSIS/MDM:   Vitals:    Vitals:    08/28/21 2315 08/29/21 0100 08/29/21 0145 08/29/21 0202   BP: 118/72  118/72 109/68   Pulse: 82 70 63 61   Resp: 16 16 13 12   Temp:       TempSrc:       SpO2: 97% 98% 98% 98%   Weight:       Height:           Patient was given the following medications:  Medications - No data to display    Patient is an otherwise healthy 77-year-old male who began developing a chest tightness along with palpitations after smoking CBD oil. Cardiac enzymes x2 along with 2 EKGs did not reveal any evidence of ischemia. Patient's heart score is low. He should be able to get an appointment with his primary care provider soon. Patient is accepting of the less than 2% chance of major adverse cardiac event in the next 6 weeks. He is agreeable with discharge. Differential diagnosis included but was not limited to: acute coronary syndrome, pulmonary embolism, COPD/asthma, pneumonia, musculoskeletal, reflux/PUD/gastritis, pneumothorax, CHF, thoracic aortic dissection, anxiety. The patient understands the importance of follow up and reasons to return. FINAL IMPRESSION      1. Chest pain, unspecified type    2.  Palpitations          DISPOSITION/PLAN   DISPOSITION Decision To Discharge 08/29/2021 02:23:38 AM      PATIENT REFERRED TO:  Pardeep Low, APRN - CNP  [address] [facility] [address]   [phone]    Schedule an appointment as soon as possible for a visit in 1 week      [facility] ED  [address] [address]  [phone]  Go to   If symptoms worsen      DISCHARGE MEDICATIONS:  Discharge Medication List as of 8/29/2021  2:24 AM          DISCONTINUED MEDICATIONS:  Discharge Medication List as of 8/29/2021  2:24 AM                 (Please note that portions of this note were completed with a voice recognition program.  Efforts were made to edit the dictations but occasionally words are mis-transcribed.)    Layne Hyatt MD(electronically signed)              Layne Hyatt MD  08/29/21 1221

## 2021-12-12 ENCOUNTER — HOSPITAL ENCOUNTER (EMERGENCY)
Age: 31
Discharge: HOME | End: 2021-12-12
Attending: STUDENT IN AN ORGANIZED HEALTH CARE EDUCATION/TRAINING PROGRAM
Payer: OTHER GOVERNMENT

## 2021-12-12 ENCOUNTER — APPOINTMENT (OUTPATIENT)
Dept: GENERAL RADIOLOGY | Age: 31
End: 2021-12-12
Payer: OTHER GOVERNMENT

## 2021-12-12 VITALS
TEMPERATURE: 97.8 F | DIASTOLIC BLOOD PRESSURE: 73 MMHG | HEART RATE: 86 BPM | HEIGHT: 66 IN | OXYGEN SATURATION: 98 % | RESPIRATION RATE: 16 BRPM | SYSTOLIC BLOOD PRESSURE: 112 MMHG | WEIGHT: 150 LBS | BODY MASS INDEX: 24.11 KG/M2

## 2021-12-12 DIAGNOSIS — R07.89 MUSCULOSKELETAL CHEST PAIN: Primary | ICD-10-CM

## 2021-12-12 PROCEDURE — 71046 X-RAY EXAM CHEST 2 VIEWS: CPT

## 2021-12-12 PROCEDURE — 99282 EMERGENCY DEPT VISIT SF MDM: CPT

## 2021-12-12 PROCEDURE — 6370000000 HC RX 637 (ALT 250 FOR IP): Performed by: STUDENT IN AN ORGANIZED HEALTH CARE EDUCATION/TRAINING PROGRAM

## 2021-12-12 RX ORDER — NAPROXEN 500 MG/1
500 TABLET ORAL ONCE
Status: COMPLETED | OUTPATIENT
Start: 2021-12-12 | End: 2021-12-12

## 2021-12-12 RX ADMIN — NAPROXEN 500 MG: 500 TABLET ORAL at 15:33

## 2021-12-12 ASSESSMENT — ENCOUNTER SYMPTOMS
SHORTNESS OF BREATH: 0
NAUSEA: 0
BACK PAIN: 0
ABDOMINAL PAIN: 0
VOMITING: 0

## 2021-12-12 ASSESSMENT — PAIN SCALES - GENERAL: PAINLEVEL_OUTOF10: 5

## 2021-12-12 ASSESSMENT — PAIN DESCRIPTION - DESCRIPTORS: DESCRIPTORS: ACHING

## 2021-12-12 ASSESSMENT — PAIN DESCRIPTION - FREQUENCY: FREQUENCY: CONTINUOUS

## 2021-12-12 ASSESSMENT — PAIN DESCRIPTION - ONSET: ONSET: GRADUAL

## 2021-12-12 ASSESSMENT — PAIN DESCRIPTION - PAIN TYPE: TYPE: ACUTE PAIN

## 2021-12-12 ASSESSMENT — PAIN DESCRIPTION - LOCATION: LOCATION: CHEST

## 2021-12-12 NOTE — ED PROVIDER NOTES
[address] ED  EMERGENCY DEPARTMENT ENCOUNTER      Pt Name: Haley Vanegas  MRN: [id number]  [place] 1990  Date of evaluation: 12/12/2021  Provider: Timbo Damon, [address] [place] [address]       Chief Complaint   Patient presents with    Muscle Pain     pt states muscle pain across chest for over a month, hurts if he pushes on bone. pt states him and wife are both being seen         HISTORY OF PRESENT ILLNESS   (Location/Symptom, Timing/Onset, Context/Setting, Quality, Duration, Modifying Factors, Severity)  Note limiting factors. Haley Vanegas is a 33 y.o. male who presents to the emergency department complaining of anterior chest wall pain. No known injury. Symptoms been ongoing for last 4 to 5 weeks. Reports no change in physical activity or job. Patient is here with significant other who is being seen for a different complaint. He does not note any rash or lesions to the torso. No reported trauma accident or fall prior to onset of symptoms. No shortness of breath however he notes when he takes deep breaths or moves the torso or upper extremities he has reproduction of pain symptoms. No abdominal pain no nausea no vomiting. No other significant medical history, no new medications. Denies muscle spasms or muscle pain in other major muscle groups. Pain seems to be isolated to the anterior chest anterior lateral chest wall. Patient has tried massage, hot baths without significant improvement, is not taking any over-the-counter's. Nursing Notes were reviewed. PAST MEDICAL HISTORY     Past Medical History:   Diagnosis Date    Chronic back pain          SURGICAL HISTORY       Past Surgical History:   Procedure Laterality Date    SHOULDER SURGERY           CURRENT MEDICATIONS       Previous Medications    No medications on file       ALLERGIES     Patient has no known allergies. FAMILY HISTORY     History reviewed. No pertinent family history.        SOCIAL HISTORY Social History     Socioeconomic History    Marital status:      Spouse name: None    Number of children: None    Years of education: None    Highest education level: None   Occupational History    None   Tobacco Use    Smoking status: Former Smoker    Smokeless tobacco: Never Used   Vaping Use    Vaping Use: Never used   Substance and Sexual Activity    Alcohol use: Yes     Alcohol/week: 2.0 - 3.0 standard drinks     Types: 2 - 3 Cans of beer per week     Comment: weekends    Drug use: No    Sexual activity: Yes     Partners: Female   Other Topics Concern    None   Social History Narrative    None     Social Determinants of Health     Financial Resource Strain:     Difficulty of Paying Living Expenses: Not on file   Food Insecurity:     Worried About Running Out of Food in the Last Year: Not on file    Estephania of Food in the Last Year: Not on file   Transportation Needs:     Lack of Transportation (Medical): Not on file    Lack of Transportation (Non-Medical):  Not on file   Physical Activity:     Days of Exercise per Week: Not on file    Minutes of Exercise per Session: Not on file   Stress:     Feeling of Stress : Not on file   Social Connections:     Frequency of Communication with Friends and Family: Not on file    Frequency of Social Gatherings with Friends and Family: Not on file    Attends Evangelical Services: Not on file    Active Member of [address] [place] [address] or Organizations: Not on file    Attends Club or Organization Meetings: Not on file    Marital Status: Not on file   Intimate Partner Violence:     Fear of Current or Ex-Partner: Not on file    Emotionally Abused: Not on file    Physically Abused: Not on file    Sexually Abused: Not on file   Housing Stability:     Unable to Pay for Housing in the Last Year: Not on file    Number of [place] in the Last Year: Not on file    Unstable Housing in the Last Year: Not on file       SCREENINGS REVIEW OF SYSTEMS    (2-9 systems for level 4, 10 or more for level 5)   Review of Systems   Constitutional: Negative for chills and fever. HENT: Negative. Respiratory: Negative for shortness of breath. Chest wall pain   Cardiovascular: Negative for chest pain. Gastrointestinal: Negative for abdominal pain, nausea and vomiting. Musculoskeletal: Positive for myalgias. Negative for back pain and neck pain. Skin: Negative for rash and wound. Neurological: Negative for headaches. Psychiatric/Behavioral: Negative for confusion. PHYSICAL EXAM    (up to 7 for level 4, 8 or more for level 5)   RECENT VITALS:     Temp: 97.8 °F (36.6 °C),  Pulse: 77, Resp: 14, BP: 114/69, SpO2: 98 %    Physical Exam  Constitutional:       Appearance: Normal appearance. HENT:      Head: Normocephalic and atraumatic. Eyes:      Pupils: Pupils are equal, round, and reactive to light. Cardiovascular:      Rate and Rhythm: Normal rate and regular rhythm. Pulmonary:      Effort: Pulmonary effort is normal.      Breath sounds: Normal breath sounds. Comments: Patient has tenderness over the pectoralis major bilaterally and tenderness along the sternal border there is no crepitus,  Musculoskeletal:      Cervical back: Normal range of motion. Skin:     General: Skin is warm and dry. Findings: No rash. Neurological:      Mental Status: He is alert. DIAGNOSTIC RESULTS     EKG: All EKG's are interpreted by the Emergency Department Physician who either signs or Co-signs this chart in the absence of a cardiologist.        RADIOLOGY:   Non-plain film images such as CT, Ultrasound and MRI are read by the radiologist. Plain radiographic images are visualized and preliminarily interpreted by the emergency physician.     Interpretation per the Radiologist below, if available at the time of this note:    XR CHEST (2 VW)   Final Result   No acute abnormality               LABS:  Labs Reviewed - No data to display    All other labs were within normal range or not returned as of this dictation. EMERGENCY DEPARTMENT COURSE and DIFFERENTIAL DIAGNOSIS/MDM:   Mary Horvath is a 33 y.o. male who presents to the emergency department with the complaint of chest wall pain ongoing for 4+ weeks. No reported trauma. Chest wall is normal in appearance, respirations even and unlabored. There is no signs of rash or overlying skin change. No crepitus. Does have reproducible chest wall pain along bilateral sternal border as well as anterior ribs and over pectoralis major. No other muscle pain. His vitals are stable he is extremely well-appearing. Symptoms ongoing for over a month. Has not tried any over-the-counter medications. His presentation is not consistent with ACS otherwise well-appearing with no significant cardiac risk factors. His physical exam is most consistent with musculoskeletal pain. Will obtain plain film imaging of chest, treat with naproxen likely discharge follow-up with PCP. X-ray negative for obvious sternal or rib fracture, lungs no signs of pneumothorax, pleural edema or pneumonia. Based on physical exam, patient presentation likely musculoskeletal pain, encouraged OTCs for symptom control follow with PCP,      I estimate there is LOW risk for PULMONARY EMBOLISM, ACUTE CORONARY SYNDROME, OR THORACIC AORTIC DISSECTION, thus I consider the discharge disposition reasonable. Mary Horvath and I have discussed the diagnosis and risks, and we agree with discharging home to follow-up with their primary doctor. We also discussed returning to the Emergency Department immediately if new or worsening symptoms occur. We have discussed the symptoms which are most concerning (e.g., bloody sputum, fever, worsening pain or shortness of breath, vomiting) that necessitate immediate return.        CRITICAL CARE TIME   Total Critical Care time was 0 minutes, excluding separately reportable procedures. There was a high probability of clinically significant/life threatening deterioration in the patient's condition which required my urgent intervention. Clinical concern  Intervention     CONSULTS:  None    PROCEDURES:  Unless otherwise noted below, none     Procedures        FINAL IMPRESSION      1. Musculoskeletal chest pain          DISPOSITION/PLAN   DISPOSITION Decision To Discharge 12/12/2021 04:12:40 PM      PATIENT REFERRED TO:  [place] [facility]  [address] [facility]  [phone]    If symptoms worsen    [facility]  [address] [place] [address]/[address]  [phone]    Schedule an appointment as soon as possible for a visit in 2 days        DISCHARGE MEDICATIONS:  New Prescriptions    No medications on file     Controlled Substances Monitoring:     No flowsheet data found.     (Please note that portions of this note were completed with a voice recognition program.  Efforts were made to edit the dictations but occasionally words are mis-transcribed.)    Timbo Damon DO (electronically signed)  Attending Emergency Physician            Timbo Damon DO  12/12/21 1015

## 2022-04-18 ENCOUNTER — HOSPITAL ENCOUNTER (OUTPATIENT)
Dept: GENERAL RADIOLOGY | Age: 32
Discharge: HOME | End: 2022-04-18
Payer: OTHER GOVERNMENT

## 2022-04-18 DIAGNOSIS — R07.81 RIB PAIN ON RIGHT SIDE: ICD-10-CM

## 2022-04-18 PROCEDURE — 71101 X-RAY EXAM UNILAT RIBS/CHEST: CPT

## 2022-05-22 ENCOUNTER — HOSPITAL ENCOUNTER (EMERGENCY)
Age: 32
Discharge: HOME | End: 2022-05-22
Attending: EMERGENCY MEDICINE
Payer: OTHER GOVERNMENT

## 2022-05-22 ENCOUNTER — APPOINTMENT (OUTPATIENT)
Dept: GENERAL RADIOLOGY | Age: 32
End: 2022-05-22
Payer: OTHER GOVERNMENT

## 2022-05-22 VITALS
OXYGEN SATURATION: 99 % | HEART RATE: 79 BPM | BODY MASS INDEX: 24.11 KG/M2 | SYSTOLIC BLOOD PRESSURE: 123 MMHG | WEIGHT: 150 LBS | HEIGHT: 66 IN | TEMPERATURE: 97.3 F | RESPIRATION RATE: 13 BRPM | DIASTOLIC BLOOD PRESSURE: 70 MMHG

## 2022-05-22 DIAGNOSIS — R07.9 CHEST PAIN, UNSPECIFIED TYPE: Primary | ICD-10-CM

## 2022-05-22 LAB
A/G RATIO: 2 (ref 1.1–2.2)
ALBUMIN SERPL-MCNC: 5 G/DL (ref 3.4–5)
ALP BLD-CCNC: 109 U/L (ref 40–129)
ALT SERPL-CCNC: 24 U/L (ref 10–40)
ANION GAP SERPL CALCULATED.3IONS-SCNC: 11 MMOL/L (ref 3–16)
AST SERPL-CCNC: 24 U/L (ref 15–37)
BASOPHILS ABSOLUTE: 0 K/UL (ref 0–0.2)
BASOPHILS RELATIVE PERCENT: 0.7 %
BILIRUB SERPL-MCNC: 0.7 MG/DL (ref 0–1)
BUN BLDV-MCNC: 9 MG/DL (ref 7–20)
CALCIUM SERPL-MCNC: 9.6 MG/DL (ref 8.3–10.6)
CHLORIDE BLD-SCNC: 102 MMOL/L (ref 99–110)
CO2: 27 MMOL/L (ref 21–32)
CREAT SERPL-MCNC: 0.9 MG/DL (ref 0.9–1.3)
EKG ATRIAL RATE: 68 BPM
EKG DIAGNOSIS: NORMAL
EKG P AXIS: 52 DEGREES
EKG P-R INTERVAL: 120 MS
EKG Q-T INTERVAL: 400 MS
EKG QRS DURATION: 74 MS
EKG QTC CALCULATION (BAZETT): 425 MS
EKG R AXIS: 54 DEGREES
EKG T AXIS: 43 DEGREES
EKG VENTRICULAR RATE: 68 BPM
EOSINOPHILS ABSOLUTE: 0.1 K/UL (ref 0–0.6)
EOSINOPHILS RELATIVE PERCENT: 0.8 %
GFR AFRICAN AMERICAN: >60
GFR NON-AFRICAN AMERICAN: >60
GLUCOSE BLD-MCNC: 104 MG/DL (ref 70–99)
HCT VFR BLD CALC: 45.7 % (ref 40.5–52.5)
HEMOGLOBIN: 15.2 G/DL (ref 13.5–17.5)
LYMPHOCYTES ABSOLUTE: 1.2 K/UL (ref 1–5.1)
LYMPHOCYTES RELATIVE PERCENT: 17.3 %
MCH RBC QN AUTO: 27.3 PG (ref 26–34)
MCHC RBC AUTO-ENTMCNC: 33.3 G/DL (ref 31–36)
MCV RBC AUTO: 82 FL (ref 80–100)
MONOCYTES ABSOLUTE: 0.3 K/UL (ref 0–1.3)
MONOCYTES RELATIVE PERCENT: 4.9 %
NEUTROPHILS ABSOLUTE: 5.2 K/UL (ref 1.7–7.7)
NEUTROPHILS RELATIVE PERCENT: 76.3 %
PDW BLD-RTO: 15 % (ref 12.4–15.4)
PLATELET # BLD: 259 K/UL (ref 135–450)
PMV BLD AUTO: 7.1 FL (ref 5–10.5)
POTASSIUM REFLEX MAGNESIUM: 4.4 MMOL/L (ref 3.5–5.1)
RBC # BLD: 5.57 M/UL (ref 4.2–5.9)
SODIUM BLD-SCNC: 140 MMOL/L (ref 136–145)
TOTAL PROTEIN: 7.5 G/DL (ref 6.4–8.2)
TROPONIN: <0.01 NG/ML
TROPONIN: <0.01 NG/ML
WBC # BLD: 6.9 K/UL (ref 4–11)

## 2022-05-22 PROCEDURE — 85025 COMPLETE CBC W/AUTO DIFF WBC: CPT

## 2022-05-22 PROCEDURE — 71045 X-RAY EXAM CHEST 1 VIEW: CPT

## 2022-05-22 PROCEDURE — 84484 ASSAY OF TROPONIN QUANT: CPT

## 2022-05-22 PROCEDURE — 93005 ELECTROCARDIOGRAM TRACING: CPT | Performed by: EMERGENCY MEDICINE

## 2022-05-22 PROCEDURE — 99285 EMERGENCY DEPT VISIT HI MDM: CPT

## 2022-05-22 PROCEDURE — 80053 COMPREHEN METABOLIC PANEL: CPT

## 2022-05-22 ASSESSMENT — PAIN - FUNCTIONAL ASSESSMENT: PAIN_FUNCTIONAL_ASSESSMENT: NONE - DENIES PAIN

## 2022-05-22 NOTE — Clinical Note
Carla Gunn was seen and treated in our emergency department on 5/22/2022. He may return to work on 05/23/2022. If you have any questions or concerns, please don't hesitate to call.       Nunu Banda MD

## 2022-05-24 NOTE — ED PROVIDER NOTES
[address] ED  EMERGENCY DEPARTMENT ENCOUNTER      Pt Name: Scott Manzo  MRN: [id number]  [place] 1990  Date of evaluation: 5/22/2022  Provider: Alisa Godinez MD    CHIEF COMPLAINT       Chief Complaint   Patient presents with   [facility] Other     reports riding to his sons soccer game and felt like his heart was racing and hands went numb. reports numbess has since resolved. reports chest tightness. denies cardiac history          HISTORY OF PRESENT ILLNESS   (Location/Symptom, Timing/Onset, Context/Setting, Quality, Duration, Modifying Factors, Severity)  Note limiting factors. Scott Manzo is a 33 y.o. male with past medical history of PTSD, depression and chronic back pain here today with chest discomfort. Patient presents emergency department today with chest pain numbness and tingling in his fingertips. Patient notes that he was in a car going to a soccer game with his wife when he began to have a substernal tightening in his chest associated with a pins and needle sensation in his fingers bilaterally. Associated with shortness of breath and palpitations. He notes the pins-and-needles have resolved but he continues to have mild chest discomfort. States he feels very anxious. No lower extremity swelling, redness or pain. No recent prolonged immobility. No abdominal pain, nausea or vomiting. No personal or family history of DVT/PE or early ACS. Patient states he has been under a tremendous amount of stress recently as both he and his wife are currently undergoing a separation and divorce. [facility]    Nursing Notes were reviewed. REVIEW OF SYSTEMS    (2-9 systems for level 4, 10 or more for level 5)     Review of Systems    Please see HPI for pertinent positive and negative review of system findings. A full 10 system ROS was performed and otherwise negative.         PAST MEDICAL HISTORY     Past Medical History:   Diagnosis Date    Chronic back pain          SURGICAL HISTORY Past Surgical History:   Procedure Laterality Date    SHOULDER SURGERY           CURRENT MEDICATIONS     There are no discharge medications for this patient. ALLERGIES     Patient has no known allergies. FAMILY HISTORY     History reviewed. No pertinent family history. SOCIAL HISTORY       Social History     Socioeconomic History    Marital status:      Spouse name: None    Number of children: None    Years of education: None    Highest education level: None   Occupational History    None   Tobacco Use    Smoking status: Former Smoker    Smokeless tobacco: Never Used   Vaping Use    Vaping Use: Never used   Substance and Sexual Activity    Alcohol use: Yes     Alcohol/week: 2.0 - 3.0 standard drinks     Types: 2 - 3 Cans of beer per week     Comment: weekends    Drug use: No    Sexual activity: Yes     Partners: Female   Other Topics Concern    None   Social History Narrative    None     Social Determinants of Health     Financial Resource Strain:     Difficulty of Paying Living Expenses: Not on file   Food Insecurity:     Worried About Running Out of Food in the Last Year: Not on file    Estephania of Food in the Last Year: Not on file   Transportation Needs:     Lack of Transportation (Medical): Not on file    Lack of Transportation (Non-Medical):  Not on file   Physical Activity:     Days of Exercise per Week: Not on file    Minutes of Exercise per Session: Not on file   Stress:     Feeling of Stress : Not on file   Social Connections:     Frequency of Communication with Friends and Family: Not on file    Frequency of Social Gatherings with Friends and Family: Not on file    Attends Voodoo Services: Not on file    Active Member of Clubs or Organizations: Not on file    Attends Club or Organization Meetings: Not on file    Marital Status: Not on file   Intimate Partner Violence:     Fear of Current or Ex-Partner: Not on file    Emotionally Abused: Not on file  Physically Abused: Not on file    Sexually Abused: Not on file   Housing Stability:     Unable to Pay for Housing in the Last Year: Not on file    Number of Places Lived in the Last Year: Not on file    Unstable Housing in the Last Year: Not on file       SCREENINGS    [place] Coma Scale  Eye Opening: Spontaneous  Best Verbal Response: Oriented  Best Motor Response: Obeys commands  [place] Coma Scale Score: 15          PHYSICAL EXAM    (up to 7 for level 4, 8 or more for level 5)     ED Triage Vitals [05/22/22 1254]   BP Temp Temp Source Pulse Resp SpO2 Height Weight   123/70 97.3 °F (36.3 °C) Skin 81 18 99 % 5' 6\" (1.676 m) 150 lb (68 kg)       Physical Exam    General appearance:  Cooperative. Mildly anxious but overall in no acute distress. Skin:  Warm. Dry. Eye:  Extraocular movements intact. Ears, nose, mouth and throat:  Oral mucosa moist,  Neck:  Trachea midline. Heart:  Regular rate and rhythm  Perfusion:  intact  Respiratory:  Lungs clear to auscultation bilaterally. Respirations nonlabored. Abdominal:   Non distended. Nontender  Neurological:  Alert and oriented x 3. Moves all extremities spontaneously  Musculoskeletal:   Normal ROM, no deformities          Psychiatric:  Normal mood      DIAGNOSTIC RESULTS       Labs Reviewed   COMPREHENSIVE METABOLIC PANEL W/ REFLEX TO MG FOR LOW K - Abnormal; Notable for the following components:       Result Value    Glucose 104 (*)     All other components within normal limits   CBC WITH AUTO DIFFERENTIAL   TROPONIN   TROPONIN       Interpretation per the Radiologist below, if obtained/available at the time of this note:    XR CHEST PORTABLE   Final Result   No acute process. All other labs/imaging were within normal range or not returned as of this dictation.     EMERGENCY DEPARTMENT COURSE and DIFFERENTIAL DIAGNOSIS/MDM:   Vitals:    Vitals:    05/22/22 1432 05/22/22 1521 05/22/22 1659 05/22/22 1805   BP:       Pulse: 80 77 84 79   Resp: 16 15 13 13   Temp:       TempSrc:       SpO2: 100%  99%    Weight:       Height:           EKG: Sinus rhythm rate of 68 bpm.  No ST elevation. Otherwise normal EKG. Patient presents emergency department today complaining of substernal chest pains palpitations. Vital signs stable. EKG completely normal.  Initial troponin negative. No risk factors or concern for DVT/PE. PERC negative. Chest x-ray clear. Overall low suspicion for ACS. Given the onset of the symptoms just prior to arrival will draw a repeat 3-hour troponin but if normal do feel comfortable with outpatient management and follow-up. Suspect underlying component of anxiety and possible panic attack given stressful situation with his wife as they are currently undergoing a divorce    MDM    CONSULTS     None    Critical Care:   None    REASSESSMENT          PROCEDURE     Unless otherwise noted below, none     Procedures      FINAL IMPRESSION      1. Chest pain, unspecified type            DISPOSITION/PLAN   DISPOSITION Decision To Discharge 05/22/2022 06:14:03 PM        PATIENT REFERRED TO:  [facility]) Pre-Services  [phone]          DISCHARGE MEDICATIONS:  There are no discharge medications for this patient. Controlled Substances Monitoring:     No flowsheet data found.     (Please note that portions of this note were completed with a voice recognition program.  Efforts were made to edit the dictations but occasionally words are mis-transcribed.)    oG Dunham MD (electronically signed)  Attending Emergency Physician            Rojas Vazquez MD  05/24/22 6918

## 2022-06-29 ENCOUNTER — HOSPITAL ENCOUNTER (OUTPATIENT)
Dept: GENERAL RADIOLOGY | Age: 32
Discharge: HOME | End: 2022-06-29
Payer: OTHER GOVERNMENT

## 2022-06-29 ENCOUNTER — HOSPITAL ENCOUNTER (OUTPATIENT)
Age: 32
Discharge: HOME | End: 2022-06-29
Payer: OTHER GOVERNMENT

## 2022-06-29 DIAGNOSIS — S93.602A FOOT SPRAIN, LEFT, INITIAL ENCOUNTER: ICD-10-CM

## 2022-06-29 DIAGNOSIS — S93.402A SPRAIN OF LEFT ANKLE, UNSPECIFIED LIGAMENT, INITIAL ENCOUNTER: ICD-10-CM

## 2022-06-29 PROCEDURE — 73630 X-RAY EXAM OF FOOT: CPT

## 2022-06-29 PROCEDURE — 73610 X-RAY EXAM OF ANKLE: CPT

## 2024-06-12 ENCOUNTER — APPOINTMENT (OUTPATIENT)
Dept: MRI IMAGING | Age: 34
End: 2024-06-12
Payer: OTHER GOVERNMENT

## 2024-06-12 ENCOUNTER — HOSPITAL ENCOUNTER (EMERGENCY)
Age: 34
Discharge: HOME | End: 2024-06-12
Attending: EMERGENCY MEDICINE
Payer: OTHER GOVERNMENT

## 2024-06-12 VITALS
HEART RATE: 78 BPM | DIASTOLIC BLOOD PRESSURE: 78 MMHG | WEIGHT: 150 LBS | RESPIRATION RATE: 16 BRPM | HEIGHT: 66 IN | TEMPERATURE: 98.3 F | OXYGEN SATURATION: 99 % | BODY MASS INDEX: 24.11 KG/M2 | SYSTOLIC BLOOD PRESSURE: 114 MMHG

## 2024-06-12 DIAGNOSIS — M54.50 ACUTE EXACERBATION OF CHRONIC LOW BACK PAIN: Primary | ICD-10-CM

## 2024-06-12 DIAGNOSIS — G89.29 ACUTE EXACERBATION OF CHRONIC LOW BACK PAIN: Primary | ICD-10-CM

## 2024-06-12 PROCEDURE — 6370000000 HC RX 637 (ALT 250 FOR IP): Performed by: EMERGENCY MEDICINE

## 2024-06-12 PROCEDURE — 96372 THER/PROPH/DIAG INJ SC/IM: CPT

## 2024-06-12 PROCEDURE — 99284 EMERGENCY DEPT VISIT MOD MDM: CPT

## 2024-06-12 PROCEDURE — 6360000002 HC RX W HCPCS: Performed by: EMERGENCY MEDICINE

## 2024-06-12 PROCEDURE — 72148 MRI LUMBAR SPINE W/O DYE: CPT

## 2024-06-12 RX ORDER — MORPHINE SULFATE 4 MG/ML
4 INJECTION, SOLUTION INTRAMUSCULAR; INTRAVENOUS ONCE
Status: COMPLETED | OUTPATIENT
Start: 2024-06-12 | End: 2024-06-12

## 2024-06-12 RX ORDER — OXYCODONE HYDROCHLORIDE AND ACETAMINOPHEN 5; 325 MG/1; MG/1
1 TABLET ORAL EVERY 8 HOURS PRN
COMMUNITY

## 2024-06-12 RX ORDER — PREDNISONE 50 MG/1
50 TABLET ORAL DAILY
Qty: 5 TABLET | Refills: 0 | Status: SHIPPED | OUTPATIENT
Start: 2024-06-12 | End: 2024-06-17

## 2024-06-12 RX ORDER — KETOROLAC TROMETHAMINE 30 MG/ML
15 INJECTION, SOLUTION INTRAMUSCULAR; INTRAVENOUS ONCE
Status: COMPLETED | OUTPATIENT
Start: 2024-06-12 | End: 2024-06-12

## 2024-06-12 RX ORDER — LIDOCAINE 50 MG/G
1 PATCH TOPICAL DAILY
Qty: 10 PATCH | Refills: 0 | Status: SHIPPED | OUTPATIENT
Start: 2024-06-12 | End: 2024-06-22

## 2024-06-12 RX ORDER — LIDOCAINE 4 G/G
1 PATCH TOPICAL ONCE
Status: DISCONTINUED | OUTPATIENT
Start: 2024-06-12 | End: 2024-06-12 | Stop reason: HOSPADM

## 2024-06-12 RX ORDER — CYCLOBENZAPRINE HCL 10 MG
5 TABLET ORAL ONCE
Status: DISCONTINUED | OUTPATIENT
Start: 2024-06-12 | End: 2024-06-12 | Stop reason: HOSPADM

## 2024-06-12 RX ADMIN — KETOROLAC TROMETHAMINE 15 MG: 30 INJECTION INTRAMUSCULAR; INTRAVENOUS at 09:13

## 2024-06-12 RX ADMIN — MORPHINE SULFATE 4 MG: 4 INJECTION, SOLUTION INTRAMUSCULAR; INTRAVENOUS at 09:12

## 2024-06-12 ASSESSMENT — PAIN SCALES - GENERAL
PAINLEVEL_OUTOF10: 0
PAINLEVEL_OUTOF10: 8

## 2024-06-12 ASSESSMENT — PAIN - FUNCTIONAL ASSESSMENT: PAIN_FUNCTIONAL_ASSESSMENT: 0-10

## 2024-06-12 NOTE — ED NOTES
C/o bilateral low back pain since this morning. States will go out without warning or injury. Required assistance to get up, reports tingling down left leg. No numbness. No injury. Able to ambulate.

## 2024-06-12 NOTE — ED PROVIDER NOTES
Emergency Department Provider Note  Location: [facility]  ED  6/12/2024     Patient Identification  Ron Gonzalez is a 33 y.o. male    Chief Complaint  Back Pain (Pt reports lower back pain, pt reports Hx of chronic back pain for years, pt reports today the pain is different. Pt reports had sharp pains in his back, followed by burning sensation in back. Pt denies any urinary symptoms. )          HPI  (History provided by patient and spouse/SO)  Patient is a 33-year-old male served in   in [place] had an injury while in service with chronic lower back pain who presents with acute on chronic lower back pain.  Patient reports this morning was up from bed and went to bend over and had a sudden searing hot burning sensation in his lower back and a bandlike distribution wrapping around like a belt both sides.  Reports that his legs \"gave out\".  Had to be helped up to the bed but believes it was because the pain was so severe.  Reports decreased sensation in his left lateral foot and left lateral thigh.  Denies any difficulty controlling urine or stool no saddle anesthesia no recent spinal injections or fevers.  Denies any significant injuries.      Nursing Notes were all reviewed and agreed with, or any disagreements were addressed in the HPI:  Allergies: No Known Allergies    Past medical history:  has a past medical history of Chronic back pain.    Past surgical history:  has a past surgical history that includes shoulder surgery.    Home medications:   Prior to Admission medications    Medication Sig Start Date End Date Taking? Authorizing Provider   oxyCODONE-acetaminophen (PERCOCET) 5-325 MG per tablet Take 1 tablet by mouth every 8 hours as needed for Pain. Pt has a script but rarely takes them   Yes Provider, MD Austyn   predniSONE (DELTASONE) 50 MG tablet Take 1 tablet by mouth daily for 5 days 6/12/24 6/17/24 Yes Geremias Beaver MD   lidocaine (LIDODERM) 5 % Place 1 patch

## 2024-10-10 ENCOUNTER — HOSPITAL ENCOUNTER (EMERGENCY)
Age: 34
Discharge: HOME | End: 2024-10-10
Attending: EMERGENCY MEDICINE
Payer: OTHER GOVERNMENT

## 2024-10-10 VITALS
OXYGEN SATURATION: 97 % | DIASTOLIC BLOOD PRESSURE: 73 MMHG | WEIGHT: 151 LBS | HEIGHT: 66 IN | RESPIRATION RATE: 17 BRPM | TEMPERATURE: 97.7 F | SYSTOLIC BLOOD PRESSURE: 128 MMHG | BODY MASS INDEX: 24.27 KG/M2 | HEART RATE: 67 BPM

## 2024-10-10 DIAGNOSIS — M54.42 ACUTE LEFT-SIDED LOW BACK PAIN WITH LEFT-SIDED SCIATICA: ICD-10-CM

## 2024-10-10 DIAGNOSIS — M54.50 ACUTE EXACERBATION OF CHRONIC LOW BACK PAIN: Primary | ICD-10-CM

## 2024-10-10 DIAGNOSIS — G89.29 ACUTE EXACERBATION OF CHRONIC LOW BACK PAIN: Primary | ICD-10-CM

## 2024-10-10 PROCEDURE — 6360000002 HC RX W HCPCS: Performed by: EMERGENCY MEDICINE

## 2024-10-10 PROCEDURE — 96372 THER/PROPH/DIAG INJ SC/IM: CPT

## 2024-10-10 PROCEDURE — 99284 EMERGENCY DEPT VISIT MOD MDM: CPT

## 2024-10-10 RX ORDER — METHYLPREDNISOLONE 4 MG
TABLET, DOSE PACK ORAL
Qty: 1 KIT | Refills: 0 | Status: SHIPPED | OUTPATIENT
Start: 2024-10-10

## 2024-10-10 RX ORDER — KETOROLAC TROMETHAMINE 30 MG/ML
60 INJECTION, SOLUTION INTRAMUSCULAR; INTRAVENOUS ONCE
Status: COMPLETED | OUTPATIENT
Start: 2024-10-10 | End: 2024-10-10

## 2024-10-10 RX ORDER — BACLOFEN 10 MG/1
10 TABLET ORAL 3 TIMES DAILY PRN
Qty: 30 TABLET | Refills: 0 | Status: SHIPPED | OUTPATIENT
Start: 2024-10-10

## 2024-10-10 RX ADMIN — KETOROLAC TROMETHAMINE 60 MG: 60 INJECTION, SOLUTION INTRAMUSCULAR at 07:56

## 2024-10-10 ASSESSMENT — LIFESTYLE VARIABLES
HOW OFTEN DO YOU HAVE A DRINK CONTAINING ALCOHOL: MONTHLY OR LESS
HOW MANY STANDARD DRINKS CONTAINING ALCOHOL DO YOU HAVE ON A TYPICAL DAY: 1 OR 2

## 2024-10-10 ASSESSMENT — PAIN DESCRIPTION - LOCATION: LOCATION: BACK

## 2024-10-10 ASSESSMENT — PAIN DESCRIPTION - DESCRIPTORS: DESCRIPTORS: DULL

## 2024-10-10 ASSESSMENT — PAIN - FUNCTIONAL ASSESSMENT: PAIN_FUNCTIONAL_ASSESSMENT: 0-10

## 2024-10-10 ASSESSMENT — PAIN DESCRIPTION - ORIENTATION: ORIENTATION: LOWER

## 2024-10-10 NOTE — ED PROVIDER NOTES
[facility] Emergency Department - [facility], Ash Calero MD, am the primary clinician of record.    CHIEF COMPLAINT  Chief Complaint   Patient presents with    Back Pain        HISTORY OF PRESENT ILLNESS  Ron Gonzalez is a 34 y.o. male  who presents to the ED complaining of exacerbation of chronic low back pain.    Has suffered from chronic low back pain since 2010 during a combat injury when he was in the .  Has seen pain management in the past, most recently March 2023, but has not followed up with them since.  This is due to some frustration in trying to get a spinal cord stimulator approved as he does not want to be constantly taking medications if possible.    Seen in June for exacerbation of chronic pain and had MRI then as below:    MRI L-spine without contrast 6/12/24:  IMPRESSION:  1. Grade 1 anterolisthesis of L5 on S1 with chronic bilateral L5 pars defects.  2. Compression of the left exiting L5 nerve root at L5-S1.  3. No significant canal stenosis.    Today, he was bending forward and picking up his dog off of the bed when he felt an immediate pain in the low lower back.  This issue to the left hip area.  He denies any acute left leg weakness and this is the typical distribution of his sciatica type pain when it occurs.  He denies any IV drug abuse history or anticoagulation.  No saddle anesthesia.  No bowel or bladder incontinence or urinary retention.  No actual falls or direct trauma to the back.  He denies any radicular symptoms in the right lower extremity or RLE weakness.  No thoracic back pain.  No recent back surgeries/procedures.  He says that in the past he has not done well with opiates and does not want any.  He also gets nausea with certain muscle relaxers and this includes Flexeril and Robaxin.  Typically he does takes NSAIDs.  He does feel a \"crunching\" when he flexes forward since this morning's non-traumatic injury.    No other complaints, modifying factors or

## 2024-10-10 NOTE — ED TRIAGE NOTES
Pt arrives to the ED with c/o lower back pain since 5:45am. Pt states that he was picking up his dog and felt a crunch. Pt states that the crunch was in the same area he previously broke his back. Pt denies numbness/tingling. Pt ambulatory to room.

## 2024-11-17 ENCOUNTER — HOSPITAL ENCOUNTER (EMERGENCY)
Age: 34
Discharge: HOME | End: 2024-11-17
Attending: EMERGENCY MEDICINE
Payer: OTHER GOVERNMENT

## 2024-11-17 ENCOUNTER — APPOINTMENT (OUTPATIENT)
Dept: GENERAL RADIOLOGY | Age: 34
End: 2024-11-17
Payer: OTHER GOVERNMENT

## 2024-11-17 VITALS
OXYGEN SATURATION: 98 % | RESPIRATION RATE: 18 BRPM | HEART RATE: 74 BPM | BODY MASS INDEX: 25.58 KG/M2 | HEIGHT: 66 IN | TEMPERATURE: 98 F | SYSTOLIC BLOOD PRESSURE: 135 MMHG | WEIGHT: 159.2 LBS | DIASTOLIC BLOOD PRESSURE: 72 MMHG

## 2024-11-17 DIAGNOSIS — R07.9 CHEST PAIN, UNSPECIFIED TYPE: ICD-10-CM

## 2024-11-17 DIAGNOSIS — F41.1 ANXIETY STATE: Primary | ICD-10-CM

## 2024-11-17 LAB
ANION GAP SERPL CALCULATED.3IONS-SCNC: 12 MMOL/L (ref 3–16)
BASOPHILS # BLD: 0.1 K/UL (ref 0–0.2)
BASOPHILS NFR BLD: 0.7 %
BUN SERPL-MCNC: 12 MG/DL (ref 7–20)
CALCIUM SERPL-MCNC: 8.2 MG/DL (ref 8.3–10.6)
CHLORIDE SERPL-SCNC: 106 MMOL/L (ref 99–110)
CO2 SERPL-SCNC: 24 MMOL/L (ref 21–32)
CREAT SERPL-MCNC: 1 MG/DL (ref 0.9–1.3)
D-DIMER QUANTITATIVE: <0.27 UG/ML FEU (ref 0–0.6)
DEPRECATED RDW RBC AUTO: 13.7 % (ref 12.4–15.4)
EKG ATRIAL RATE: 117 BPM
EKG DIAGNOSIS: NORMAL
EKG P AXIS: 55 DEGREES
EKG P-R INTERVAL: 136 MS
EKG Q-T INTERVAL: 362 MS
EKG QRS DURATION: 78 MS
EKG QTC CALCULATION (BAZETT): 504 MS
EKG R AXIS: 34 DEGREES
EKG T AXIS: 16 DEGREES
EKG VENTRICULAR RATE: 117 BPM
EOSINOPHIL # BLD: 0.1 K/UL (ref 0–0.6)
EOSINOPHIL NFR BLD: 1.7 %
GFR SERPLBLD CREATININE-BSD FMLA CKD-EPI: >90 ML/MIN/{1.73_M2}
GLUCOSE SERPL-MCNC: 103 MG/DL (ref 70–99)
HCT VFR BLD AUTO: 44.1 % (ref 40.5–52.5)
HGB BLD-MCNC: 14.8 G/DL (ref 13.5–17.5)
LYMPHOCYTES # BLD: 2.4 K/UL (ref 1–5.1)
LYMPHOCYTES NFR BLD: 30.9 %
MAGNESIUM SERPL-MCNC: 1.76 MG/DL (ref 1.8–2.4)
MCH RBC QN AUTO: 27.4 PG (ref 26–34)
MCHC RBC AUTO-ENTMCNC: 33.6 G/DL (ref 31–36)
MCV RBC AUTO: 81.5 FL (ref 80–100)
MONOCYTES # BLD: 0.6 K/UL (ref 0–1.3)
MONOCYTES NFR BLD: 7.5 %
NEUTROPHILS # BLD: 4.7 K/UL (ref 1.7–7.7)
NEUTROPHILS NFR BLD: 59.2 %
PLATELET # BLD AUTO: 252 K/UL (ref 135–450)
PMV BLD AUTO: 8.2 FL (ref 5–10.5)
POTASSIUM SERPL-SCNC: 3.5 MMOL/L (ref 3.5–5.1)
RBC # BLD AUTO: 5.41 M/UL (ref 4.2–5.9)
SODIUM SERPL-SCNC: 142 MMOL/L (ref 136–145)
TROPONIN, HIGH SENSITIVITY: <6 NG/L (ref 0–22)
WBC # BLD AUTO: 7.9 K/UL (ref 4–11)

## 2024-11-17 PROCEDURE — 80048 BASIC METABOLIC PNL TOTAL CA: CPT

## 2024-11-17 PROCEDURE — 6370000000 HC RX 637 (ALT 250 FOR IP): Performed by: EMERGENCY MEDICINE

## 2024-11-17 PROCEDURE — 85379 FIBRIN DEGRADATION QUANT: CPT

## 2024-11-17 PROCEDURE — 83735 ASSAY OF MAGNESIUM: CPT

## 2024-11-17 PROCEDURE — 84484 ASSAY OF TROPONIN QUANT: CPT

## 2024-11-17 PROCEDURE — 71045 X-RAY EXAM CHEST 1 VIEW: CPT

## 2024-11-17 PROCEDURE — 99285 EMERGENCY DEPT VISIT HI MDM: CPT

## 2024-11-17 PROCEDURE — 93005 ELECTROCARDIOGRAM TRACING: CPT | Performed by: EMERGENCY MEDICINE

## 2024-11-17 PROCEDURE — 93010 ELECTROCARDIOGRAM REPORT: CPT | Performed by: INTERNAL MEDICINE

## 2024-11-17 PROCEDURE — 85025 COMPLETE CBC W/AUTO DIFF WBC: CPT

## 2024-11-17 RX ORDER — LANOLIN ALCOHOL/MO/W.PET/CERES
400 CREAM (GRAM) TOPICAL ONCE
Status: COMPLETED | OUTPATIENT
Start: 2024-11-17 | End: 2024-11-17

## 2024-11-17 RX ORDER — LORAZEPAM 1 MG/1
1 TABLET ORAL ONCE
Status: COMPLETED | OUTPATIENT
Start: 2024-11-17 | End: 2024-11-17

## 2024-11-17 RX ADMIN — LORAZEPAM 1 MG: 1 TABLET ORAL at 08:20

## 2024-11-17 RX ADMIN — Medication 400 MG: at 08:43

## 2024-11-17 ASSESSMENT — HEART SCORE: ECG: NON-SPECIFC REPOLARIZATION DISTURBANCE/LBTB/PM

## 2024-11-17 ASSESSMENT — LIFESTYLE VARIABLES
HOW OFTEN DO YOU HAVE A DRINK CONTAINING ALCOHOL: NEVER
HOW MANY STANDARD DRINKS CONTAINING ALCOHOL DO YOU HAVE ON A TYPICAL DAY: PATIENT DOES NOT DRINK

## 2024-11-17 ASSESSMENT — PAIN - FUNCTIONAL ASSESSMENT: PAIN_FUNCTIONAL_ASSESSMENT: NONE - DENIES PAIN

## 2024-11-17 NOTE — ED PROVIDER NOTES
[facility] ED  EMERGENCY DEPARTMENT ENCOUNTER        Pt Name: Ron Gonzalez  MRN: [id number]  Birthdate 1990  Date of evaluation: 11/17/2024  Provider: Laith Olivo DO  PCP: Tex Sal MD  Note Started: 7:52 AM EST 11/17/24    CHIEF COMPLAINT      Chest Pain, Facial Numbness    HISTORY OF PRESENT ILLNESS: 1 or more Elements     Chief Complaint   Patient presents with    Chest Pain     Chest tightness, left side of face numb. About 20 minutes.      History from : Patient  Limitations to history : None    Ron Gonzalez is a 34 y.o. male who presents to the emergency department secondary to concern for chest pain and left-sided facial numbness.  Reports that he was driving in a relatively normal morning when he began feeling left-sided facial numbness and chest tightness.  States that he was worried about the symptoms he decided to come get checked out.  Reports that he was in the  and has a history of PTSD with panic attacks, however this feels different.  Upon arrival to the ED, states that his symptoms are improving, but wanted to still come get checked out.  Denies smoking.  Denies any cardiac history in the past.    Past medical history noted below. Aside from what is stated above denies any other symptoms or modifying factors.   reports that he has quit smoking. He has never used smokeless tobacco. He reports current alcohol use of about 2.0 - 3.0 standard drinks of alcohol per week. He reports that he does not use drugs.  Nursing Notes were all reviewed and agreed with or any disagreements addressed in HPI/MDM.  REVIEW OF SYSTEMS :    Review of Systems   Constitutional:  Negative for chills and fever.   HENT:  Negative for congestion and rhinorrhea.    Eyes:  Negative for pain and redness.   Respiratory:  Negative for cough and shortness of breath.    Cardiovascular:  Positive for chest pain. Negative for leg swelling.   Gastrointestinal:  Negative for abdominal pain,

## 2024-11-17 NOTE — ED NOTES
Discharge instructions reviewed with patient.  All question answered.  Pt verbalized understanding.  Pt left in stable condition.  Wife will pick patient up from the lobby.